# Patient Record
Sex: FEMALE | Race: WHITE
[De-identification: names, ages, dates, MRNs, and addresses within clinical notes are randomized per-mention and may not be internally consistent; named-entity substitution may affect disease eponyms.]

---

## 2017-10-11 ENCOUNTER — APPOINTMENT (OUTPATIENT)
Dept: FAMILY MEDICINE | Facility: CLINIC | Age: 52
End: 2017-10-11
Payer: COMMERCIAL

## 2017-10-11 VITALS
HEART RATE: 90 BPM | DIASTOLIC BLOOD PRESSURE: 72 MMHG | BODY MASS INDEX: 26.63 KG/M2 | SYSTOLIC BLOOD PRESSURE: 126 MMHG | OXYGEN SATURATION: 98 % | TEMPERATURE: 98.4 F | RESPIRATION RATE: 16 BRPM | WEIGHT: 186 LBS | HEIGHT: 70 IN

## 2017-10-11 DIAGNOSIS — Q07.00 ARNOLD-CHIARI SYNDROME W/OUT SPINA BIFIDA OR HYDROCEPHALUS: ICD-10-CM

## 2017-10-11 DIAGNOSIS — C51.9 MALIGNANT NEOPLASM OF VULVA, UNSPECIFIED: ICD-10-CM

## 2017-10-11 DIAGNOSIS — Z82.0 FAMILY HISTORY OF EPILEPSY AND OTHER DISEASES OF THE NERVOUS SYSTEM: ICD-10-CM

## 2017-10-11 DIAGNOSIS — I47.1 SUPRAVENTRICULAR TACHYCARDIA: ICD-10-CM

## 2017-10-11 DIAGNOSIS — F17.200 NICOTINE DEPENDENCE, UNSPECIFIED, UNCOMPLICATED: ICD-10-CM

## 2017-10-11 DIAGNOSIS — Z87.19 PERSONAL HISTORY OF OTHER DISEASES OF THE DIGESTIVE SYSTEM: ICD-10-CM

## 2017-10-11 DIAGNOSIS — Z83.79 FAMILY HISTORY OF OTHER DISEASES OF THE DIGESTIVE SYSTEM: ICD-10-CM

## 2017-10-11 PROBLEM — Z00.00 ENCOUNTER FOR PREVENTIVE HEALTH EXAMINATION: Status: ACTIVE | Noted: 2017-10-11

## 2017-10-11 PROCEDURE — G0008: CPT

## 2017-10-11 PROCEDURE — 90686 IIV4 VACC NO PRSV 0.5 ML IM: CPT

## 2017-10-11 PROCEDURE — 99214 OFFICE O/P EST MOD 30 MIN: CPT | Mod: 25

## 2017-11-06 ENCOUNTER — APPOINTMENT (OUTPATIENT)
Dept: FAMILY MEDICINE | Facility: CLINIC | Age: 52
End: 2017-11-06
Payer: COMMERCIAL

## 2017-11-06 VITALS
WEIGHT: 186 LBS | HEIGHT: 70 IN | HEART RATE: 108 BPM | TEMPERATURE: 98.1 F | OXYGEN SATURATION: 98 % | DIASTOLIC BLOOD PRESSURE: 72 MMHG | RESPIRATION RATE: 16 BRPM | SYSTOLIC BLOOD PRESSURE: 122 MMHG | BODY MASS INDEX: 26.63 KG/M2

## 2017-11-06 DIAGNOSIS — E55.9 VITAMIN D DEFICIENCY, UNSPECIFIED: ICD-10-CM

## 2017-11-06 PROCEDURE — 99214 OFFICE O/P EST MOD 30 MIN: CPT

## 2017-11-06 RX ORDER — LITHIUM CARBONATE 150 MG/1
150 CAPSULE ORAL
Qty: 90 | Refills: 0 | Status: DISCONTINUED | COMMUNITY
Start: 2017-08-01 | End: 2017-11-06

## 2017-11-06 RX ORDER — LEVOFLOXACIN 500 MG/1
500 TABLET, FILM COATED ORAL
Qty: 10 | Refills: 0 | Status: DISCONTINUED | COMMUNITY
Start: 2017-04-18 | End: 2017-11-06

## 2017-11-07 RX ORDER — ALPRAZOLAM 2 MG/1
2 TABLET ORAL
Qty: 120 | Refills: 0 | Status: DISCONTINUED | COMMUNITY
Start: 2017-04-18 | End: 2017-11-07

## 2017-11-07 RX ORDER — HYDROCODONE BITARTRATE AND HOMATROPINE METHYLBROMIDE 5; 1.5 MG/5ML; MG/5ML
5-1.5 SYRUP ORAL
Qty: 240 | Refills: 0 | Status: DISCONTINUED | COMMUNITY
Start: 2017-10-11 | End: 2017-11-07

## 2017-11-07 RX ORDER — ALPRAZOLAM 2 MG/1
2 TABLET ORAL 4 TIMES DAILY
Qty: 120 | Refills: 0 | Status: DISCONTINUED | COMMUNITY
Start: 2017-10-11 | End: 2017-11-07

## 2017-11-07 RX ORDER — HYDROCODONE BITARTRATE AND HOMATROPINE METHYLBROMIDE 5; 1.5 MG/5ML; MG/5ML
5-1.5 SYRUP ORAL
Qty: 473 | Refills: 0 | Status: DISCONTINUED | COMMUNITY
Start: 2017-04-18 | End: 2017-11-07

## 2017-11-07 RX ORDER — OMEPRAZOLE 20 MG/1
20 CAPSULE, DELAYED RELEASE ORAL
Qty: 30 | Refills: 0 | Status: DISCONTINUED | COMMUNITY
Start: 2017-03-15 | End: 2017-11-07

## 2017-12-01 ENCOUNTER — APPOINTMENT (OUTPATIENT)
Dept: FAMILY MEDICINE | Facility: CLINIC | Age: 52
End: 2017-12-01
Payer: SELF-PAY

## 2017-12-01 ENCOUNTER — RX RENEWAL (OUTPATIENT)
Age: 52
End: 2017-12-01

## 2017-12-01 VITALS
TEMPERATURE: 98.7 F | DIASTOLIC BLOOD PRESSURE: 74 MMHG | WEIGHT: 185 LBS | SYSTOLIC BLOOD PRESSURE: 132 MMHG | HEIGHT: 70 IN | HEART RATE: 88 BPM | OXYGEN SATURATION: 97 % | RESPIRATION RATE: 16 BRPM | BODY MASS INDEX: 26.48 KG/M2

## 2017-12-01 DIAGNOSIS — E53.8 DEFICIENCY OF OTHER SPECIFIED B GROUP VITAMINS: ICD-10-CM

## 2017-12-01 DIAGNOSIS — E87.6 HYPOKALEMIA: ICD-10-CM

## 2017-12-01 DIAGNOSIS — K64.9 UNSPECIFIED HEMORRHOIDS: ICD-10-CM

## 2017-12-01 PROCEDURE — 99214 OFFICE O/P EST MOD 30 MIN: CPT

## 2017-12-01 RX ORDER — VENLAFAXINE HYDROCHLORIDE 75 MG/1
75 CAPSULE, EXTENDED RELEASE ORAL
Qty: 90 | Refills: 0 | Status: DISCONTINUED | COMMUNITY
Start: 2017-11-06 | End: 2017-12-01

## 2017-12-01 RX ORDER — VENLAFAXINE HYDROCHLORIDE 150 MG/1
150 TABLET, EXTENDED RELEASE ORAL DAILY
Qty: 90 | Refills: 0 | Status: DISCONTINUED | COMMUNITY
Start: 2017-12-01 | End: 2017-12-01

## 2018-01-30 ENCOUNTER — APPOINTMENT (OUTPATIENT)
Dept: FAMILY MEDICINE | Facility: CLINIC | Age: 53
End: 2018-01-30
Payer: MEDICAID

## 2018-01-30 VITALS
HEART RATE: 116 BPM | WEIGHT: 185 LBS | HEIGHT: 70 IN | SYSTOLIC BLOOD PRESSURE: 120 MMHG | OXYGEN SATURATION: 98 % | RESPIRATION RATE: 16 BRPM | DIASTOLIC BLOOD PRESSURE: 84 MMHG | BODY MASS INDEX: 26.48 KG/M2

## 2018-01-30 DIAGNOSIS — R61 GENERALIZED HYPERHIDROSIS: ICD-10-CM

## 2018-01-30 PROCEDURE — 99213 OFFICE O/P EST LOW 20 MIN: CPT

## 2018-01-30 RX ORDER — VENLAFAXINE HYDROCHLORIDE 150 MG/1
150 CAPSULE, EXTENDED RELEASE ORAL DAILY
Qty: 90 | Refills: 3 | Status: DISCONTINUED | COMMUNITY
Start: 2017-12-01 | End: 2018-01-30

## 2018-01-30 RX ORDER — POTASSIUM CHLORIDE 750 MG/1
10 TABLET, EXTENDED RELEASE ORAL
Qty: 30 | Refills: 0 | Status: DISCONTINUED | COMMUNITY
Start: 2017-10-11 | End: 2018-01-30

## 2018-03-01 ENCOUNTER — MEDICATION RENEWAL (OUTPATIENT)
Age: 53
End: 2018-03-01

## 2018-03-29 ENCOUNTER — APPOINTMENT (OUTPATIENT)
Dept: FAMILY MEDICINE | Facility: CLINIC | Age: 53
End: 2018-03-29
Payer: SELF-PAY

## 2018-03-29 VITALS
WEIGHT: 185 LBS | SYSTOLIC BLOOD PRESSURE: 108 MMHG | RESPIRATION RATE: 16 BRPM | BODY MASS INDEX: 26.48 KG/M2 | DIASTOLIC BLOOD PRESSURE: 70 MMHG | HEIGHT: 70 IN

## 2018-03-29 DIAGNOSIS — Z12.31 ENCOUNTER FOR SCREENING MAMMOGRAM FOR MALIGNANT NEOPLASM OF BREAST: ICD-10-CM

## 2018-03-29 PROCEDURE — 99213 OFFICE O/P EST LOW 20 MIN: CPT

## 2018-03-29 RX ORDER — VENLAFAXINE 37.5 MG/1
37.5 TABLET, EXTENDED RELEASE ORAL DAILY
Qty: 30 | Refills: 0 | Status: DISCONTINUED | COMMUNITY
Start: 2018-01-30 | End: 2018-03-29

## 2018-03-29 RX ORDER — VARENICLINE TARTRATE 1 MG/1
1 TABLET, FILM COATED ORAL
Qty: 56 | Refills: 0 | Status: DISCONTINUED | COMMUNITY
Start: 2017-05-16 | End: 2018-03-29

## 2018-04-26 ENCOUNTER — APPOINTMENT (OUTPATIENT)
Dept: FAMILY MEDICINE | Facility: CLINIC | Age: 53
End: 2018-04-26
Payer: COMMERCIAL

## 2018-04-26 VITALS
OXYGEN SATURATION: 97 % | DIASTOLIC BLOOD PRESSURE: 70 MMHG | SYSTOLIC BLOOD PRESSURE: 110 MMHG | HEIGHT: 70 IN | WEIGHT: 185 LBS | BODY MASS INDEX: 26.48 KG/M2 | HEART RATE: 94 BPM

## 2018-04-26 DIAGNOSIS — R20.0 ANESTHESIA OF SKIN: ICD-10-CM

## 2018-04-26 PROCEDURE — 99214 OFFICE O/P EST MOD 30 MIN: CPT

## 2018-05-24 ENCOUNTER — RX RENEWAL (OUTPATIENT)
Age: 53
End: 2018-05-24

## 2018-05-24 ENCOUNTER — APPOINTMENT (OUTPATIENT)
Dept: FAMILY MEDICINE | Facility: CLINIC | Age: 53
End: 2018-05-24
Payer: MEDICAID

## 2018-05-24 VITALS
HEART RATE: 105 BPM | HEIGHT: 70 IN | SYSTOLIC BLOOD PRESSURE: 114 MMHG | OXYGEN SATURATION: 96 % | WEIGHT: 185 LBS | BODY MASS INDEX: 26.48 KG/M2 | DIASTOLIC BLOOD PRESSURE: 74 MMHG

## 2018-05-24 LAB — CYTOLOGY CVX/VAG DOC THIN PREP: NORMAL

## 2018-05-24 PROCEDURE — 99213 OFFICE O/P EST LOW 20 MIN: CPT

## 2018-05-24 RX ORDER — NITROFURANTOIN (MONOHYDRATE/MACROCRYSTALS) 25; 75 MG/1; MG/1
100 CAPSULE ORAL DAILY
Qty: 20 | Refills: 0 | Status: DISCONTINUED | COMMUNITY
Start: 2018-03-29 | End: 2018-05-24

## 2018-05-24 NOTE — REVIEW OF SYSTEMS
[Fatigue] : fatigue [Cough] : cough [Anxiety] : anxiety [Depression] : depression [Negative] : Cardiovascular
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
## 2018-05-24 NOTE — ASSESSMENT
[FreeTextEntry1] :  As per usual protocol the patient was advised in regards to the risks of driving when on medications with side effects of dizziness or drowsiness. Patient has been assessed  for increase risk for respiratory depression. Istop checked.\par Emotional support given\par obtain labs from Rheumatology

## 2018-05-24 NOTE — PHYSICAL EXAM
[Well Developed] : well developed [Well Nourished] : well nourished [Pressed] : was pressed [Normal Rhythm/Effort] : normal respiratory rhythm and effort [Clear Bilaterally] : the lungs were clear to auscultation bilaterally [Normal to Percussion] : the lungs were normal to percussion [Normal] : palpation of the chest was normal [Normal Rate] : normal [Normal S1] : normal S1 [Normal S2] : normal S2 [No Murmur] : no murmurs heard [No Pitting Edema] : no pitting edema present [2+] : left 2+ [No Abnormalities] : the abdominal aorta was not enlarged and no bruit was heard [S3] : no S3 [S4] : no S4 [Right Carotid Bruit] : no bruit heard over the right carotid [Left Carotid Bruit] : no bruit heard over the left carotid [Right Femoral Bruit] : no bruit heard over the right femoral artery [Left Femoral Bruit] : no bruit heard over the left femoral artery [de-identified] : Right ankle brace

## 2018-06-20 ENCOUNTER — MEDICATION RENEWAL (OUTPATIENT)
Age: 53
End: 2018-06-20

## 2018-06-21 ENCOUNTER — APPOINTMENT (OUTPATIENT)
Dept: FAMILY MEDICINE | Facility: CLINIC | Age: 53
End: 2018-06-21

## 2018-07-18 ENCOUNTER — APPOINTMENT (OUTPATIENT)
Dept: FAMILY MEDICINE | Facility: CLINIC | Age: 53
End: 2018-07-18
Payer: MEDICAID

## 2018-07-18 VITALS
HEART RATE: 100 BPM | DIASTOLIC BLOOD PRESSURE: 82 MMHG | OXYGEN SATURATION: 97 % | BODY MASS INDEX: 27.84 KG/M2 | SYSTOLIC BLOOD PRESSURE: 116 MMHG | WEIGHT: 194 LBS

## 2018-07-18 PROCEDURE — 36415 COLL VENOUS BLD VENIPUNCTURE: CPT

## 2018-07-18 PROCEDURE — 99214 OFFICE O/P EST MOD 30 MIN: CPT | Mod: 25

## 2018-07-18 RX ORDER — QUETIAPINE FUMARATE 50 MG/1
50 TABLET ORAL
Qty: 30 | Refills: 0 | Status: DISCONTINUED | COMMUNITY
Start: 2018-05-24 | End: 2018-07-18

## 2018-07-18 NOTE — ASSESSMENT
[FreeTextEntry1] : restart omeprazole she has it at home she may increase to bid for two weeks if no relief go to Gastroenterology. Obtain liver function test to follow up alk phos alt.\par renew meds.  As per usual protocol the patient was advised in regards to the risks of driving when on medications with side effects of dizziness or drowsiness. Patient has been assessed  for increase risk for respiratory depression. Istop checked.\par wean cough medicine\par \par

## 2018-07-18 NOTE — HISTORY OF PRESENT ILLNESS
[FreeTextEntry8] : Pt is here for digestive issue. Pt c/o stomach cramping and diarrhea for about 1 day.  I don’t know if it is something I ate or stress or a virus. I ate cinnamon buns rice and vegetables carrots peas and mushrooms. I had diarrhea all day. I tried to eat chicken soup and bread.  No blood in the stool . No fever no chills.  Last month I had flu like symptoms with out a fever last month . I had migraines I had a runny nose coughing and it lasted 3 weeks. I was weak. I was able to get medicaid. I had to pay for meds  for cough because medicaid doesn’tt cover it . I cannot take cherry nyquil.  The cough medicine helps me.  Pt would also like refill on medication. \par I have a history of duodenal ulcer and twisted intestine. I had colonoscopy and endoscopy The rheum said my lft is high. ( see results).\par \par \par my son nathalie isnt living with me anymore. He is living with his dad. He is jaundice. He is supposed to go to have genetic blood disorders. I am angry and frustrated .

## 2018-07-18 NOTE — REVIEW OF SYSTEMS
[Fatigue] : fatigue [Abdominal Pain] : abdominal pain [Nausea] : nausea [Diarrhea] : diarrhea [Anxiety] : anxiety [Depression] : depression [Negative] : Respiratory

## 2018-07-18 NOTE — PHYSICAL EXAM
[Normal Rhythm/Effort] : normal respiratory rhythm and effort [Clear Bilaterally] : the lungs were clear to auscultation bilaterally [Normal to Percussion] : the lungs were normal to percussion [Normal] : palpation of the chest was normal [Normal Rate] : normal [Normal S1] : normal S1 [Normal S2] : normal S2 [No Murmur] : no murmurs heard [No Pitting Edema] : no pitting edema present [2+] : left 2+ [No Abnormalities] : the abdominal aorta was not enlarged and no bruit was heard [Epigastric] : in the epigastric area [LUQ] : in the left upper quadrant [No HSM] : no hepatosplenomegaly noted [S3] : no S3 [S4] : no S4 [Right Carotid Bruit] : no bruit heard over the right carotid [Left Carotid Bruit] : no bruit heard over the left carotid [Right Femoral Bruit] : no bruit heard over the right femoral artery [Left Femoral Bruit] : no bruit heard over the left femoral artery [Periumbilical] : not periumbilical [Suprapubic] : not in the suprapubic area [RUQ] : not in the RUQ [RLQ] : not in the RLQ [Liver Tender To Palpation] : not tender [de-identified] : right ankle in brace

## 2018-07-23 LAB
ALBUMIN SERPL ELPH-MCNC: 4.4 G/DL
ALKPISO INTERP: NORMAL
ALP BLD-CCNC: 127 U/L
ALP BLD-CCNC: 129 U/L
ALP BONE CFR SERPL: 35 %
ALP INTEST CFR SERPL: 0 %
ALP LIVER CFR SERPL: 65 %
ALP MACRO CFR SERPL: 0 %
ALP PLAC CFR SERPL: 0 %
ALT SERPL-CCNC: 14 U/L
ANION GAP SERPL CALC-SCNC: 16 MMOL/L
AST SERPL-CCNC: 19 U/L
BASOPHILS # BLD AUTO: 0.06 K/UL
BASOPHILS NFR BLD AUTO: 0.6 %
BILIRUB SERPL-MCNC: 0.5 MG/DL
BUN SERPL-MCNC: 4 MG/DL
CALCIUM SERPL-MCNC: 9.5 MG/DL
CHLORIDE SERPL-SCNC: 107 MMOL/L
CO2 SERPL-SCNC: 24 MMOL/L
CREAT SERPL-MCNC: 0.77 MG/DL
EOSINOPHIL # BLD AUTO: 0.24 K/UL
EOSINOPHIL NFR BLD AUTO: 2.5 %
GLUCOSE SERPL-MCNC: 95 MG/DL
HCT VFR BLD CALC: 44.5 %
HGB BLD-MCNC: 14.6 G/DL
IMM GRANULOCYTES NFR BLD AUTO: 0.3 %
LYMPHOCYTES # BLD AUTO: 4.37 K/UL
LYMPHOCYTES NFR BLD AUTO: 45.6 %
MAN DIFF?: NORMAL
MCHC RBC-ENTMCNC: 32.5 PG
MCHC RBC-ENTMCNC: 32.8 GM/DL
MCV RBC AUTO: 99.1 FL
MONOCYTES # BLD AUTO: 0.48 K/UL
MONOCYTES NFR BLD AUTO: 5 %
NEUTROPHILS # BLD AUTO: 4.4 K/UL
NEUTROPHILS NFR BLD AUTO: 46 %
PLATELET # BLD AUTO: 386 K/UL
POTASSIUM SERPL-SCNC: 3.9 MMOL/L
PROT SERPL-MCNC: 7.5 G/DL
RBC # BLD: 4.49 M/UL
RBC # FLD: 13.9 %
SODIUM SERPL-SCNC: 147 MMOL/L
WBC # FLD AUTO: 9.58 K/UL

## 2018-08-20 ENCOUNTER — RX RENEWAL (OUTPATIENT)
Age: 53
End: 2018-08-20

## 2018-08-31 ENCOUNTER — APPOINTMENT (OUTPATIENT)
Dept: FAMILY MEDICINE | Facility: CLINIC | Age: 53
End: 2018-08-31
Payer: MEDICAID

## 2018-08-31 VITALS
BODY MASS INDEX: 25.77 KG/M2 | DIASTOLIC BLOOD PRESSURE: 78 MMHG | SYSTOLIC BLOOD PRESSURE: 124 MMHG | HEART RATE: 78 BPM | WEIGHT: 180 LBS | OXYGEN SATURATION: 98 % | RESPIRATION RATE: 16 BRPM | HEIGHT: 70 IN

## 2018-08-31 PROCEDURE — 36415 COLL VENOUS BLD VENIPUNCTURE: CPT

## 2018-08-31 PROCEDURE — 99213 OFFICE O/P EST LOW 20 MIN: CPT | Mod: 25

## 2018-08-31 NOTE — PHYSICAL EXAM
[Ill-Appearing] : ill-appearing [Conjunctiva] : the conjunctiva were normal in both eyes [PERRL] : pupils were equal in size, round, and reactive to light [EOM Intact] : extraocular movements were intact [Normal Rhythm/Effort] : normal respiratory rhythm and effort [Clear Bilaterally] : the lungs were clear to auscultation bilaterally [Normal to Percussion] : the lungs were normal to percussion [Normal] : palpation of the chest was normal [Normal Rate] : normal [Normal S1] : normal S1 [Normal S2] : normal S2 [No Murmur] : no murmurs heard [No Pitting Edema] : no pitting edema present [2+] : left 2+ [No Abnormalities] : the abdominal aorta was not enlarged and no bruit was heard [Anxious] : anxious [S3] : no S3 [S4] : no S4 [Right Carotid Bruit] : no bruit heard over the right carotid [Left Carotid Bruit] : no bruit heard over the left carotid [Right Femoral Bruit] : no bruit heard over the right femoral artery [Left Femoral Bruit] : no bruit heard over the left femoral artery

## 2018-08-31 NOTE — REVIEW OF SYSTEMS
[Fatigue] : fatigue [Recent Change In Weight] : ~T recent weight change [Cough] : cough [Negative] : Cardiovascular

## 2018-08-31 NOTE — ASSESSMENT
[FreeTextEntry1] : patient has xanax. She will trintellix.  As per usual protocol the patient was advised in regards to the risks of driving when on medications with side effects of dizziness or drowsiness. Patient has been assessed  for increase risk for respiratory depression. Istop checked.\par Labs drawn/ specimens obtained  in office on this date of service  for evaluation of   assessed conditions -hyperlipidemia, high sodium      to be run at [facility].\par

## 2018-08-31 NOTE — HISTORY OF PRESENT ILLNESS
[FreeTextEntry1] : Pt presents for med check [de-identified] : 51 yo wf here for follow up medications for anxiety  I am not eating I am still tired. i sleep about 6 hrs and I am up. My anxiety is throught the roof. My son is living with my . His health is not good. My other son is not working. He needs to see a psychiatrist.  My other son who graduated is looking for a job.  [employer]. \par

## 2018-09-04 LAB
ALBUMIN SERPL ELPH-MCNC: 4.1 G/DL
ALP BLD-CCNC: 122 U/L
ALT SERPL-CCNC: 19 U/L
ANION GAP SERPL CALC-SCNC: 15 MMOL/L
AST SERPL-CCNC: 23 U/L
BILIRUB SERPL-MCNC: 0.2 MG/DL
BUN SERPL-MCNC: 5 MG/DL
CALCIUM SERPL-MCNC: 9.2 MG/DL
CHLORIDE SERPL-SCNC: 107 MMOL/L
CHOLEST SERPL-MCNC: 271 MG/DL
CHOLEST/HDLC SERPL: 7.3 RATIO
CO2 SERPL-SCNC: 24 MMOL/L
CREAT SERPL-MCNC: 0.84 MG/DL
GLUCOSE SERPL-MCNC: 80 MG/DL
HDLC SERPL-MCNC: 37 MG/DL
LDLC SERPL CALC-MCNC: 192 MG/DL
POTASSIUM SERPL-SCNC: 3.6 MMOL/L
PROT SERPL-MCNC: 7.3 G/DL
SODIUM SERPL-SCNC: 146 MMOL/L
TRIGL SERPL-MCNC: 212 MG/DL

## 2018-09-11 LAB — COMPREHENSIVE SCREEN URINE: NORMAL

## 2018-09-11 RX ORDER — ROSUVASTATIN CALCIUM 20 MG/1
20 TABLET, FILM COATED ORAL DAILY
Qty: 90 | Refills: 3 | Status: DISCONTINUED | COMMUNITY
Start: 2017-03-20 | End: 2018-09-11

## 2018-09-24 ENCOUNTER — APPOINTMENT (OUTPATIENT)
Dept: FAMILY MEDICINE | Facility: CLINIC | Age: 53
End: 2018-09-24
Payer: MEDICAID

## 2018-09-24 VITALS
WEIGHT: 180 LBS | OXYGEN SATURATION: 98 % | SYSTOLIC BLOOD PRESSURE: 132 MMHG | RESPIRATION RATE: 16 BRPM | HEIGHT: 70 IN | HEART RATE: 66 BPM | BODY MASS INDEX: 25.77 KG/M2 | DIASTOLIC BLOOD PRESSURE: 84 MMHG

## 2018-09-24 PROCEDURE — 99213 OFFICE O/P EST LOW 20 MIN: CPT

## 2018-09-24 NOTE — PHYSICAL EXAM
[Tremulous] : was tremulous [Normal Rhythm/Effort] : normal respiratory rhythm and effort [Clear Bilaterally] : the lungs were clear to auscultation bilaterally [Normal to Percussion] : the lungs were normal to percussion [Normal] : palpation of the chest was normal [Normal Rate] : normal [Normal S1] : normal S1 [Normal S2] : normal S2 [No Murmur] : no murmurs heard [No Pitting Edema] : no pitting edema present [2+] : left 2+ [No Abnormalities] : the abdominal aorta was not enlarged and no bruit was heard [S3] : no S3 [S4] : no S4 [Right Carotid Bruit] : no bruit heard over the right carotid [Left Carotid Bruit] : no bruit heard over the left carotid [Right Femoral Bruit] : no bruit heard over the right femoral artery [Left Femoral Bruit] : no bruit heard over the left femoral artery

## 2018-09-24 NOTE — REVIEW OF SYSTEMS
[Fatigue] : fatigue [Recent Change In Weight] : ~T recent weight change [Palpitations] : palpitations [Cough] : cough [Anxiety] : anxiety [Depression] : depression [Negative] : ENT

## 2018-09-24 NOTE — ASSESSMENT
[FreeTextEntry1] : renew xanax\par  As per usual protocol the patient was advised in regards to the risks of driving when on medications with side effects of dizziness or drowsiness. Patient has been assessed  for increase risk for respiratory depression. Istop checked.\par

## 2018-09-24 NOTE — HISTORY OF PRESENT ILLNESS
[FreeTextEntry1] : Patients presents for medication refill [de-identified] : 51 yo wf here for follow up on anxiety and depression\par Things are insane. 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 [employer] says its not their fault and it is the 's fault. It is thursday.\par I have a half of a xanax left

## 2018-10-23 ENCOUNTER — MEDICATION RENEWAL (OUTPATIENT)
Age: 53
End: 2018-10-23

## 2018-10-24 ENCOUNTER — APPOINTMENT (OUTPATIENT)
Dept: FAMILY MEDICINE | Facility: CLINIC | Age: 53
End: 2018-10-24

## 2018-11-21 ENCOUNTER — APPOINTMENT (OUTPATIENT)
Dept: FAMILY MEDICINE | Facility: CLINIC | Age: 53
End: 2018-11-21
Payer: MEDICAID

## 2018-11-21 VITALS
SYSTOLIC BLOOD PRESSURE: 124 MMHG | HEIGHT: 70 IN | RESPIRATION RATE: 16 BRPM | BODY MASS INDEX: 25.77 KG/M2 | WEIGHT: 180 LBS | HEART RATE: 76 BPM | DIASTOLIC BLOOD PRESSURE: 82 MMHG | OXYGEN SATURATION: 97 %

## 2018-11-21 PROCEDURE — 99213 OFFICE O/P EST LOW 20 MIN: CPT

## 2018-11-21 NOTE — HISTORY OF PRESENT ILLNESS
[FreeTextEntry1] : Patient presents for med check\par  [de-identified] : 54 yo wf here for follow up on anxiety. This is a hard time of year.  I am not compliant with my cholesterol meds. I forget. My ankle is still sore abraham with the cold. I am still smoking.  I had run out of my meds and I was going through withdrawals.

## 2018-11-21 NOTE — ASSESSMENT
[FreeTextEntry1] : renew xanax.  As per usual protocol the patient was advised in regards to the risks of driving when on medications with side effects of dizziness or drowsiness. Patient has been assessed  for increase risk for respiratory depression. Istop checked.\par patient doesn’t tolerate any other cholesterol meds other than rosuvastatin and it not covered. Basic cardiovascular prevention measures are advised including regular exercise, surveillance medical examination, and prudent portion-controlled low fat diet, rich in a variety of vegetables with minimal added sugars, refined starches, and no artificially hydrogenated oils.\par

## 2018-11-21 NOTE — REVIEW OF SYSTEMS
[Fatigue] : fatigue [Recent Change In Weight] : ~T recent weight change [Cough] : cough [Anxiety] : anxiety [Negative] : Cardiovascular

## 2018-11-21 NOTE — PHYSICAL EXAM
[No Acute Distress] : no acute distress [Well Nourished] : well nourished [Well Developed] : well developed [Normal Sclera/Conjunctiva] : normal sclera/conjunctiva [PERRL] : pupils equal round and reactive to light [Normal Outer Ear/Nose] : the outer ears and nose were normal in appearance [Normal Oropharynx] : the oropharynx was normal [No JVD] : no jugular venous distention [No Respiratory Distress] : no respiratory distress  [Clear to Auscultation] : lungs were clear to auscultation bilaterally [Normal Rate] : normal rate  [Regular Rhythm] : with a regular rhythm

## 2018-11-21 NOTE — HEALTH RISK ASSESSMENT
[No falls in past year] : Patient reported no falls in the past year [] : No [de-identified] : rheumatology

## 2018-12-07 ENCOUNTER — MEDICATION RENEWAL (OUTPATIENT)
Age: 53
End: 2018-12-07

## 2018-12-13 RX ORDER — VORTIOXETINE 20 MG/1
20 TABLET, FILM COATED ORAL
Qty: 30 | Refills: 3 | Status: DISCONTINUED | COMMUNITY
Start: 2018-05-24 | End: 2018-12-13

## 2018-12-21 ENCOUNTER — APPOINTMENT (OUTPATIENT)
Dept: FAMILY MEDICINE | Facility: CLINIC | Age: 53
End: 2018-12-21
Payer: MEDICAID

## 2018-12-21 VITALS
HEART RATE: 97 BPM | BODY MASS INDEX: 25.48 KG/M2 | HEIGHT: 70 IN | OXYGEN SATURATION: 97 % | DIASTOLIC BLOOD PRESSURE: 90 MMHG | RESPIRATION RATE: 16 BRPM | SYSTOLIC BLOOD PRESSURE: 126 MMHG | TEMPERATURE: 98.7 F | WEIGHT: 178 LBS

## 2018-12-21 PROCEDURE — 99213 OFFICE O/P EST LOW 20 MIN: CPT

## 2018-12-21 NOTE — PHYSICAL EXAM
[Ill-Appearing] : ill-appearing [Scattered Wheezes] : scattered wheezing was heard [Rhonchi Bilateral] : rhonchi were heard diffusely over both lungs [Normal Rate] : normal [Normal S1] : normal S1 [Normal S2] : normal S2 [No Murmur] : no murmurs heard [No Pitting Edema] : no pitting edema present [2+] : left 2+ [No Abnormalities] : the abdominal aorta was not enlarged and no bruit was heard [S3] : no S3 [S4] : no S4 [Right Carotid Bruit] : no bruit heard over the right carotid [Left Carotid Bruit] : no bruit heard over the left carotid [Right Femoral Bruit] : no bruit heard over the right femoral artery [Left Femoral Bruit] : no bruit heard over the left femoral artery

## 2018-12-21 NOTE — ASSESSMENT
[FreeTextEntry1] : Take antibiotics,  rest,  increase fluids, wash hands to prevent the spread of  infection . Take mucinex dm over the counter. Take tylenol or advil for fever or body aches. Follow up if no better or worse respiratory symptoms.\par renew xanax  As per usual protocol the patient was advised in regards to the risks of driving when on medications with side effects of dizziness or drowsiness. Patient has been assessed  for increase risk for respiratory depression. Istop checked.\par

## 2018-12-21 NOTE — HISTORY OF PRESENT ILLNESS
[FreeTextEntry8] : Patient C/O \par  +ST, +cough with phlegm, +HA, + ear pain(LEFT), -sinus pressure, +congestion, -temp, +SOB   About three days ago\par I have been exposed to pneumonia\par

## 2018-12-21 NOTE — REVIEW OF SYSTEMS
[Chills] : chills [Fatigue] : fatigue [Recent Change In Weight] : ~T recent weight change [Shortness Of Breath] : shortness of breath [Wheezing] : wheezing [Cough] : cough

## 2019-01-18 ENCOUNTER — APPOINTMENT (OUTPATIENT)
Dept: FAMILY MEDICINE | Facility: CLINIC | Age: 54
End: 2019-01-18
Payer: MEDICAID

## 2019-01-18 VITALS
HEART RATE: 96 BPM | SYSTOLIC BLOOD PRESSURE: 130 MMHG | OXYGEN SATURATION: 95 % | BODY MASS INDEX: 25.48 KG/M2 | HEIGHT: 70 IN | DIASTOLIC BLOOD PRESSURE: 80 MMHG | RESPIRATION RATE: 16 BRPM | WEIGHT: 178 LBS

## 2019-01-18 DIAGNOSIS — R53.1 WEAKNESS: ICD-10-CM

## 2019-01-18 PROCEDURE — 99213 OFFICE O/P EST LOW 20 MIN: CPT | Mod: 25

## 2019-01-18 PROCEDURE — 36415 COLL VENOUS BLD VENIPUNCTURE: CPT

## 2019-01-18 RX ORDER — LEVOFLOXACIN 500 MG/1
500 TABLET, FILM COATED ORAL DAILY
Qty: 10 | Refills: 0 | Status: DISCONTINUED | COMMUNITY
Start: 2018-12-21 | End: 2019-01-18

## 2019-01-18 NOTE — PHYSICAL EXAM
[Ill-Appearing] : ill-appearing [Normal Sclera/Conjunctiva] : normal sclera/conjunctiva [PERRL] : pupils equal round and reactive to light [No Lymphadenopathy] : no lymphadenopathy [Wet Cough] : a wet cough was heard [Scattered Wheezes] : scattered wheezing was heard [Rales / Crackles Right] : crackles were heard diffusely over the right  lung [Rales / Crackles Right [place]] : crackles were heard over the right apex [Rales / Crackles Right Midlung Field] : crackles were heard over the right midlung field [Rales / Crackles Right Base] : crackles were heard over the right base [Wheezing Bilaterally] : wheezing was heard diffusely over both lungs [Rhonchi Bilateral] : rhonchi were heard diffusely over both lungs

## 2019-01-18 NOTE — HEALTH RISK ASSESSMENT
[No falls in past year] : Patient reported no falls in the past year [3] : 2) Feeling down, depressed, or hopeless for nearly every day (3) [] : No [de-identified] : no [BGM2Stuas] : 6 [ULS2Foolr] : 24

## 2019-01-18 NOTE — HISTORY OF PRESENT ILLNESS
[FreeTextEntry1] : patient present for 1 month follow up [de-identified] : 54 yo wf here for follow up on CATALINA\par I have been sick for over 1 month. I take day quil I take robitussin dm.  I am coughing. I had a blown ear drum. I started throwing up too fever sweats and then got better and now I have another cold. I cant get rid of it. my son is sick too. I am so tired . I am so tired I sleep 12 hrs and I am still exhausted and weak.

## 2019-01-18 NOTE — ASSESSMENT
[FreeTextEntry1] : Take antibiotics,  rest,  increase fluids, wash hands to prevent the spread of  infection . Take mucinex dm over the counter. Take tylenol or advil for fever or body aches. Follow up if no better or worse respiratory symptoms.\par  As per usual protocol the patient was advised in regards to the risks of driving when on medications with side effects of dizziness or drowsiness. Patient has been assessed  for increase risk for respiratory depression. Istop checked.\par Labs drawn/ specimens obtained  in office on this date of service  for evaluation of   assessed conditions -  cbc  to be run at [facility].\par  \par Activity as tolerated\par Go to ER if worse chest pain or shortness of breath.\par

## 2019-01-21 LAB
ALBUMIN SERPL ELPH-MCNC: 3.8 G/DL
ALP BLD-CCNC: 171 U/L
ALT SERPL-CCNC: 12 U/L
ANION GAP SERPL CALC-SCNC: 11 MMOL/L
AST SERPL-CCNC: 21 U/L
BASOPHILS # BLD AUTO: 0.05 K/UL
BASOPHILS NFR BLD AUTO: 0.6 %
BILIRUB SERPL-MCNC: 0.2 MG/DL
BUN SERPL-MCNC: 10 MG/DL
CALCIUM SERPL-MCNC: 9.2 MG/DL
CHLORIDE SERPL-SCNC: 108 MMOL/L
CHOLEST SERPL-MCNC: 223 MG/DL
CHOLEST/HDLC SERPL: 6.4 RATIO
CO2 SERPL-SCNC: 23 MMOL/L
CREAT SERPL-MCNC: 0.92 MG/DL
EOSINOPHIL # BLD AUTO: 0.38 K/UL
EOSINOPHIL NFR BLD AUTO: 4.3 %
FOLATE SERPL-MCNC: 5 NG/ML
GLUCOSE SERPL-MCNC: 93 MG/DL
HCT VFR BLD CALC: 41.1 %
HDLC SERPL-MCNC: 35 MG/DL
HGB BLD-MCNC: 13.4 G/DL
IMM GRANULOCYTES NFR BLD AUTO: 0.2 %
LDLC SERPL CALC-MCNC: 161 MG/DL
LYMPHOCYTES # BLD AUTO: 3.13 K/UL
LYMPHOCYTES NFR BLD AUTO: 35.4 %
MAN DIFF?: NORMAL
MCHC RBC-ENTMCNC: 30.9 PG
MCHC RBC-ENTMCNC: 32.6 GM/DL
MCV RBC AUTO: 94.7 FL
MONOCYTES # BLD AUTO: 0.54 K/UL
MONOCYTES NFR BLD AUTO: 6.1 %
NEUTROPHILS # BLD AUTO: 4.73 K/UL
NEUTROPHILS NFR BLD AUTO: 53.4 %
PLATELET # BLD AUTO: 359 K/UL
POTASSIUM SERPL-SCNC: 4.1 MMOL/L
PROT SERPL-MCNC: 6.8 G/DL
RBC # BLD: 4.34 M/UL
RBC # FLD: 14.2 %
SODIUM SERPL-SCNC: 142 MMOL/L
TRIGL SERPL-MCNC: 134 MG/DL
TSH SERPL-ACNC: 2.33 UIU/ML
VIT B12 SERPL-MCNC: 860 PG/ML
WBC # FLD AUTO: 8.85 K/UL

## 2019-02-13 ENCOUNTER — LABORATORY RESULT (OUTPATIENT)
Age: 54
End: 2019-02-13

## 2019-02-14 ENCOUNTER — APPOINTMENT (OUTPATIENT)
Dept: FAMILY MEDICINE | Facility: CLINIC | Age: 54
End: 2019-02-14
Payer: MEDICAID

## 2019-02-14 VITALS
RESPIRATION RATE: 16 BRPM | OXYGEN SATURATION: 97 % | BODY MASS INDEX: 25.48 KG/M2 | SYSTOLIC BLOOD PRESSURE: 120 MMHG | HEIGHT: 70 IN | WEIGHT: 178 LBS | HEART RATE: 102 BPM | DIASTOLIC BLOOD PRESSURE: 80 MMHG

## 2019-02-14 PROCEDURE — 99213 OFFICE O/P EST LOW 20 MIN: CPT

## 2019-02-14 RX ORDER — AMOXICILLIN AND CLAVULANATE POTASSIUM 875; 125 MG/1; MG/1
875-125 TABLET, COATED ORAL
Qty: 20 | Refills: 0 | Status: DISCONTINUED | COMMUNITY
Start: 2019-01-18 | End: 2019-02-14

## 2019-02-14 RX ORDER — PREDNISONE 20 MG/1
20 TABLET ORAL
Qty: 16 | Refills: 0 | Status: DISCONTINUED | COMMUNITY
Start: 2019-01-18 | End: 2019-02-14

## 2019-02-14 NOTE — ASSESSMENT
[FreeTextEntry1] : renew xanax and cough syrup. trial lithium for anxiety take mirtazepine earlier in the evening for reduction of fatigue. \par try daliresp for copd. mucolytic\par culture of sputum\par  As per usual protocol the patient was advised in regards to the risks of driving when on medications with side effects of dizziness or drowsiness. Patient has been assessed  for increase risk for respiratory depression. Patient denies suicidal ideations or plan.  Istop checked.\par We spent sufficient time to discuss aspects of care; questions were answered  to patient's satisfaction.The diagnosis and care plan were discussed with patient in detail.  Patient test results were  reviewed and explained in full. All questions were answered.\par mammo\par

## 2019-02-14 NOTE — PHYSICAL EXAM
[Ill-Appearing] : ill-appearing [Normal Sclera/Conjunctiva] : normal sclera/conjunctiva [Normal Outer Ear/Nose] : the outer ears and nose were normal in appearance [Normal Oropharynx] : the oropharynx was normal [Scattered Wheezes] : scattered wheezing was heard [Rhonchi Bilateral] : rhonchi were heard diffusely over both lungs [Normal Rate] : normal rate  [Regular Rhythm] : with a regular rhythm [Normal S1, S2] : normal S1 and S2 [de-identified] : left TM hole

## 2019-02-14 NOTE — HEALTH RISK ASSESSMENT
[No falls in past year] : Patient reported no falls in the past year [] : No [de-identified] : rheum

## 2019-02-14 NOTE — HISTORY OF PRESENT ILLNESS
[FreeTextEntry1] : Patient presents for 1 month follow up [de-identified] : 52 yo wf here for follow up on anxiety. \par I have been coughing stuff up. I have a sample. \par I want to ask about the remeron. I think it is working but I am eating all the time and I am starving all the time. I am not sure if it is poor impulse control. I was feeling better one night brushing my hair I looked at my bangs and cut them. It did not go well. It makes me eat. I don’t want to end up depressed from being fat. 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 \par I am tired again. My legs are heavy and when it is cold I could nto breath I need an inhaler . Oscar said he could not breath either and he doesn’t smoke. I am concerned with pneumonia. I have been on levaquin and augmentin. I feel like it is coming back. \par

## 2019-03-15 ENCOUNTER — MEDICATION RENEWAL (OUTPATIENT)
Age: 54
End: 2019-03-15

## 2019-04-15 ENCOUNTER — APPOINTMENT (OUTPATIENT)
Dept: FAMILY MEDICINE | Facility: CLINIC | Age: 54
End: 2019-04-15
Payer: MEDICAID

## 2019-04-15 VITALS
BODY MASS INDEX: 25.77 KG/M2 | HEIGHT: 70 IN | RESPIRATION RATE: 16 BRPM | HEART RATE: 108 BPM | WEIGHT: 180 LBS | SYSTOLIC BLOOD PRESSURE: 122 MMHG | DIASTOLIC BLOOD PRESSURE: 80 MMHG | OXYGEN SATURATION: 97 %

## 2019-04-15 DIAGNOSIS — J18.0 BRONCHOPNEUMONIA, UNSPECIFIED ORGANISM: ICD-10-CM

## 2019-04-15 PROCEDURE — 99213 OFFICE O/P EST LOW 20 MIN: CPT

## 2019-04-15 RX ORDER — ROSUVASTATIN CALCIUM 20 MG/1
20 TABLET, FILM COATED ORAL
Qty: 30 | Refills: 3 | Status: DISCONTINUED | COMMUNITY
Start: 2018-09-04 | End: 2019-04-15

## 2019-04-15 RX ORDER — OMEPRAZOLE 40 MG/1
40 CAPSULE, DELAYED RELEASE ORAL
Qty: 30 | Refills: 3 | Status: DISCONTINUED | COMMUNITY
Start: 2017-08-01 | End: 2019-04-15

## 2019-04-15 RX ORDER — MIRTAZAPINE 15 MG/1
15 TABLET, FILM COATED ORAL
Qty: 90 | Refills: 0 | Status: DISCONTINUED | COMMUNITY
Start: 2018-12-13 | End: 2019-04-15

## 2019-04-15 RX ORDER — ROFLUMILAST 500 UG/1
500 TABLET ORAL DAILY
Qty: 30 | Refills: 3 | Status: DISCONTINUED | COMMUNITY
Start: 2019-02-14 | End: 2019-04-15

## 2019-04-15 NOTE — HEALTH RISK ASSESSMENT
[No falls in past year] : Patient reported no falls in the past year [30 or more] : 30 or more [] : No [de-identified] : no [2] : 2) Feeling down, depressed, or hopeless for more than half of the days (2) [de-identified] : none [UCG8Velir] : 4 [de-identified] : poor [JKT4Ptzpi] : 12
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
## 2019-04-15 NOTE — ASSESSMENT
[FreeTextEntry1] :  renew xanax As per usual protocol the patient was advised in regards to the risks of driving when on medications with side effects of dizziness or drowsiness. Patient has been assessed  for increase risk for respiratory depression. Patient denies suicidal ideations or plan.  Istop checked. phq improved  her total was 13 down from 21\par renew all other meds she is low on all.\par We spent sufficient time to discuss aspects of care; questions were answered  to patient's satisfaction.The diagnosis and care plan were discussed with patient in detail.  Patient test results were  reviewed and explained in full. All questions and concerns  were answered to the best of my knowledge.\par

## 2019-04-15 NOTE — PHYSICAL EXAM
[Normal Oropharynx] : the oropharynx was normal [Supple] : supple [No Accessory Muscle Use] : no accessory muscle use [Clear to Auscultation] : lungs were clear to auscultation bilaterally [Normal Rate] : normal rate  [Regular Rhythm] : with a regular rhythm [Normal S1, S2] : normal S1 and S2

## 2019-05-13 ENCOUNTER — APPOINTMENT (OUTPATIENT)
Dept: FAMILY MEDICINE | Facility: CLINIC | Age: 54
End: 2019-05-13
Payer: MEDICAID

## 2019-05-13 VITALS
SYSTOLIC BLOOD PRESSURE: 116 MMHG | HEART RATE: 94 BPM | OXYGEN SATURATION: 97 % | DIASTOLIC BLOOD PRESSURE: 80 MMHG | RESPIRATION RATE: 14 BRPM

## 2019-05-13 PROCEDURE — 99213 OFFICE O/P EST LOW 20 MIN: CPT

## 2019-05-13 NOTE — ASSESSMENT
[FreeTextEntry1] :  As per usual protocol the patient was advised in regards to the risks of driving when on medications with side effects of dizziness or drowsiness. Patient has been assessed  for increase risk for respiratory depression. Patient denies suicidal ideations or plan.  Istop checked.\par Basic cardiovascular prevention measures are advised including regular exercise, surveillance medical examination, and prudent portion-controlled low fat diet, rich in a variety of vegetables with minimal added sugars, refined starches, and no artificially hydrogenated oils.\par

## 2019-05-13 NOTE — HISTORY OF PRESENT ILLNESS
[FreeTextEntry1] : pt presents for one month check  [de-identified] : 52 yo wf here for follow up on  anxiety \par I had a very nice mothers day. I had 2 our 3 boys with me.  Castro called  and texted me and he gave me a song. "I've been meaning to call you" I bawled my eyes out. He just got a new job and he said he took me to lunch  Joseph and Oscar joined me We went to [facility]  in [place]. It was [place] and good. They gave me flowers. I was shocked. \par

## 2019-05-13 NOTE — REVIEW OF SYSTEMS
[Fatigue] : fatigue [Recent Change In Weight] : ~T recent weight change [Joint Pain] : joint pain [Joint Stiffness] : joint stiffness [Muscle Weakness] : muscle weakness [Muscle Pain] : muscle pain [Anxiety] : anxiety [Depression] : depression [Negative] : Gastrointestinal

## 2019-05-13 NOTE — PHYSICAL EXAM
[Well Nourished] : well nourished [Well Developed] : well developed [PERRL] : pupils equal round and reactive to light [EOMI] : extraocular movements intact [No Respiratory Distress] : no respiratory distress  [Clear to Auscultation] : lungs were clear to auscultation bilaterally [No Accessory Muscle Use] : no accessory muscle use [Normal Rate] : normal rate  [Regular Rhythm] : with a regular rhythm [Normal S1, S2] : normal S1 and S2

## 2019-05-13 NOTE — HEALTH RISK ASSESSMENT
[No falls in past year] : Patient reported no falls in the past year [] : No [de-identified] : rheum [de-identified] : no [de-identified] : no

## 2019-05-15 LAB — LITHIUM SERPL-SCNC: 0.29 MMOL/L

## 2019-05-17 ENCOUNTER — APPOINTMENT (OUTPATIENT)
Dept: FAMILY MEDICINE | Facility: CLINIC | Age: 54
End: 2019-05-17

## 2019-06-12 ENCOUNTER — MEDICATION RENEWAL (OUTPATIENT)
Age: 54
End: 2019-06-12

## 2019-06-13 ENCOUNTER — APPOINTMENT (OUTPATIENT)
Dept: FAMILY MEDICINE | Facility: CLINIC | Age: 54
End: 2019-06-13
Payer: MEDICAID

## 2019-06-13 VITALS
RESPIRATION RATE: 14 BRPM | SYSTOLIC BLOOD PRESSURE: 120 MMHG | DIASTOLIC BLOOD PRESSURE: 78 MMHG | OXYGEN SATURATION: 99 % | HEART RATE: 99 BPM

## 2019-06-13 PROCEDURE — 99213 OFFICE O/P EST LOW 20 MIN: CPT

## 2019-06-13 NOTE — HISTORY OF PRESENT ILLNESS
[FreeTextEntry1] : pt presents for 1 month check  [de-identified] : 54 yo wf here for follow up on anxiety. I went through mediation  of wc case and I lost a lot. I lost 5 years of my life and so much more. I have a permanent injury. BUt its finally over. Workmans comp has a lean on everything and then I have to pay the . I believed the Asplund family was gonna take care of me. I am glad it is over. I can move on with my life.

## 2019-06-13 NOTE — HEALTH RISK ASSESSMENT
[No falls in past year] : Patient reported no falls in the past year [] : No [de-identified] : adls [de-identified] : healthy

## 2019-06-13 NOTE — PHYSICAL EXAM
[Normal Appearance] : was normal in appearance [Enlarged Diffusely] : was not enlarged [Neck Supple] : was supple [No Respiratory Distress] : no respiratory distress  [Clear to Auscultation] : lungs were clear to auscultation bilaterally [Normal Rate] : normal rate  [Normal S1, S2] : normal S1 and S2 [Regular Rhythm] : with a regular rhythm

## 2019-06-13 NOTE — REVIEW OF SYSTEMS
[Fatigue] : fatigue [Chest Pain] : chest pain [Palpitations] : palpitations [Negative] : Gastrointestinal

## 2019-07-12 ENCOUNTER — APPOINTMENT (OUTPATIENT)
Dept: FAMILY MEDICINE | Facility: CLINIC | Age: 54
End: 2019-07-12
Payer: MEDICAID

## 2019-07-12 VITALS
BODY MASS INDEX: 25.77 KG/M2 | SYSTOLIC BLOOD PRESSURE: 110 MMHG | HEIGHT: 70 IN | WEIGHT: 180 LBS | OXYGEN SATURATION: 98 % | RESPIRATION RATE: 14 BRPM | HEART RATE: 108 BPM | DIASTOLIC BLOOD PRESSURE: 80 MMHG

## 2019-07-12 PROCEDURE — 36415 COLL VENOUS BLD VENIPUNCTURE: CPT

## 2019-07-12 PROCEDURE — 99213 OFFICE O/P EST LOW 20 MIN: CPT | Mod: 25

## 2019-07-12 NOTE — ASSESSMENT
[FreeTextEntry1] : renew xanax\par  As per usual protocol the patient was advised in regards to the risks of driving when on medications with side effects of dizziness or drowsiness. Patient has been assessed  for increase risk for respiratory depression. Patient denies suicidal ideations or plan.  Istop checked.\par Basic cardiovascular prevention measures are advised including regular exercise, surveillance medical examination, and prudent portion-controlled low fat diet, rich in a variety of vegetables with minimal added sugars, refined starches, and no artificially hydrogenated oils.\par \par Labs drawn/ specimens obtained  in office on this date of service  for evaluation of   assessed conditions -lft   lipids    to be run at [facility].\par

## 2019-07-12 NOTE — PHYSICAL EXAM
[Well Nourished] : well nourished [No Accessory Muscle Use] : no accessory muscle use [No Respiratory Distress] : no respiratory distress  [Regular Rhythm] : with a regular rhythm [Normal Rate] : normal rate  [Normal S1, S2] : normal S1 and S2 [Tachycardia] : tachycardic

## 2019-07-12 NOTE — HEALTH RISK ASSESSMENT
[] : Yes [No] : In the past 12 months have you used drugs other than those required for medical reasons? No [de-identified] : walk [de-identified] : poor

## 2019-07-12 NOTE — HISTORY OF PRESENT ILLNESS
[FreeTextEntry1] : patient presents for 1 month follow up\par  [de-identified] : 54 yo wf here for follow up on anxiety and hyperlipidemia\par she is having a lot of trouble with her boss. he is very demanding.  She needs xanax. Otherwise patient reports feeling well.  Patient specifically denies chest pain, shortness of breath, dyspnea on exertion, edema, PND, orthopnea, dizziness, or syncope. Patient reports compliance with medications. Patient denies fever, chills, night sweats, nausea, vomiting , no pain, erythema, swollen joints, hematuria, hematochezia , hematemesis, or melena.\par

## 2019-07-12 NOTE — REVIEW OF SYSTEMS
[Fatigue] : fatigue [Negative] : Cardiovascular [Joint Pain] : joint pain [Joint Swelling] : joint swelling [Anxiety] : anxiety

## 2019-07-15 LAB
ALBUMIN SERPL ELPH-MCNC: 4.3 G/DL
ALP BLD-CCNC: 199 U/L
ALT SERPL-CCNC: 31 U/L
ANION GAP SERPL CALC-SCNC: 12 MMOL/L
AST SERPL-CCNC: 25 U/L
BILIRUB SERPL-MCNC: 0.5 MG/DL
BUN SERPL-MCNC: 9 MG/DL
CALCIUM SERPL-MCNC: 9.4 MG/DL
CHLORIDE SERPL-SCNC: 110 MMOL/L
CHOLEST SERPL-MCNC: 238 MG/DL
CHOLEST/HDLC SERPL: 6 RATIO
CO2 SERPL-SCNC: 23 MMOL/L
CREAT SERPL-MCNC: 0.83 MG/DL
GLUCOSE SERPL-MCNC: 108 MG/DL
HDLC SERPL-MCNC: 40 MG/DL
LDLC SERPL CALC-MCNC: 171 MG/DL
POTASSIUM SERPL-SCNC: 4.3 MMOL/L
PROT SERPL-MCNC: 6.6 G/DL
SODIUM SERPL-SCNC: 145 MMOL/L
TRIGL SERPL-MCNC: 135 MG/DL

## 2019-08-13 ENCOUNTER — APPOINTMENT (OUTPATIENT)
Dept: FAMILY MEDICINE | Facility: CLINIC | Age: 54
End: 2019-08-13
Payer: MEDICAID

## 2019-08-13 VITALS
HEART RATE: 90 BPM | OXYGEN SATURATION: 98 % | DIASTOLIC BLOOD PRESSURE: 80 MMHG | RESPIRATION RATE: 14 BRPM | SYSTOLIC BLOOD PRESSURE: 120 MMHG

## 2019-08-13 DIAGNOSIS — J30.9 ALLERGIC RHINITIS, UNSPECIFIED: ICD-10-CM

## 2019-08-13 PROCEDURE — 99213 OFFICE O/P EST LOW 20 MIN: CPT

## 2019-08-13 RX ORDER — CLONAZEPAM 1 MG/1
1 TABLET ORAL
Qty: 21 | Refills: 0 | Status: COMPLETED | COMMUNITY
Start: 2019-08-05 | End: 2019-08-13

## 2019-08-13 NOTE — HISTORY OF PRESENT ILLNESS
[FreeTextEntry1] : pt presents for 1 month check  [de-identified] : 54 yo wf here for follow up on anxiety. The klonopin didn’t work. I feel like  I have a lump in my throat. The xanax works much better. I have tried many antidepressants and the ssri give me side effects. I did not like venlafaxine. Trintellix was not covered. Wellbutrin was bad.

## 2019-08-13 NOTE — REVIEW OF SYSTEMS
[Fatigue] : fatigue [Cough] : cough [Insomnia] : insomnia [Anxiety] : anxiety [Negative] : Cardiovascular

## 2019-08-13 NOTE — HEALTH RISK ASSESSMENT
[] : Yes [No] : In the past 12 months have you used drugs other than those required for medical reasons? No [No falls in past year] : Patient reported no falls in the past year [de-identified] : rheum [Audit-CScore] : 0 [de-identified] : walk [de-identified] : normal

## 2019-08-13 NOTE — ASSESSMENT
[FreeTextEntry1] : try to seek psychiatrist for further evaluation of anxiety. renew xanax.  As per usual protocol the patient was advised in regards to the risks of driving when on medications with side effects of dizziness or drowsiness. Patient has been assessed  for increase risk for respiratory depression. Patient denies suicidal ideations or plan.  Istop checked.\par renew cough meds.\par dc tobacco encouraged. \par

## 2019-09-12 ENCOUNTER — APPOINTMENT (OUTPATIENT)
Dept: FAMILY MEDICINE | Facility: CLINIC | Age: 54
End: 2019-09-12
Payer: MEDICAID

## 2019-09-12 VITALS
DIASTOLIC BLOOD PRESSURE: 80 MMHG | RESPIRATION RATE: 14 BRPM | HEART RATE: 100 BPM | SYSTOLIC BLOOD PRESSURE: 130 MMHG | OXYGEN SATURATION: 98 %

## 2019-09-12 DIAGNOSIS — N81.4 UTEROVAGINAL PROLAPSE, UNSPECIFIED: ICD-10-CM

## 2019-09-12 DIAGNOSIS — K58.9 IRRITABLE BOWEL SYNDROME W/OUT DIARRHEA: ICD-10-CM

## 2019-09-12 DIAGNOSIS — R25.2 CRAMP AND SPASM: ICD-10-CM

## 2019-09-12 DIAGNOSIS — K26.9 DUODENAL ULCER, UNSPECIFIED AS ACUTE OR CHRONIC, W/OUT HEMORRHAGE OR PERFORATION: ICD-10-CM

## 2019-09-12 PROCEDURE — 99215 OFFICE O/P EST HI 40 MIN: CPT | Mod: 25

## 2019-09-12 PROCEDURE — G0008: CPT

## 2019-09-12 PROCEDURE — 90674 CCIIV4 VAC NO PRSV 0.5 ML IM: CPT

## 2019-09-12 RX ORDER — POLYMYXIN B SULFATE AND TRIMETHOPRIM 10000; 1 [USP'U]/ML; MG/ML
10000-0.1 SOLUTION OPHTHALMIC
Qty: 1 | Refills: 0 | Status: COMPLETED | COMMUNITY
Start: 2019-08-13 | End: 2019-09-12

## 2019-09-12 RX ORDER — IBUPROFEN 600 MG/1
600 TABLET, FILM COATED ORAL
Qty: 60 | Refills: 0 | Status: COMPLETED | COMMUNITY
Start: 2017-08-31 | End: 2019-09-12

## 2019-09-12 NOTE — HISTORY OF PRESENT ILLNESS
[FreeTextEntry1] : pt presents for med check  [de-identified] : 52 yo wf here for follow up on anxiety and mood disorder\par \par Reporting  acute onset for several weeks, cramps in her calves, bilaterally, occurs several nights per week, pain rated 10/10.   Reports relieved by "squeezing under her nose", after which the pain goes away.   \par \par Here for renewal for xanax, cough medication. Reporting worsening anxiety since the last time she was here, she has concerns about  in the hospital with heart failure.  \par \par Cough is chronic, reports worsening at night when she lies down takes cough medication as needed. \par \par Also reports gas pains in her left lower abdomen and something "moving" which resolved after using the bathroom.

## 2019-09-12 NOTE — HEALTH RISK ASSESSMENT
[] : Yes [No] : In the past 12 months have you used drugs other than those required for medical reasons? 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

## 2019-09-12 NOTE — ASSESSMENT
[FreeTextEntry1] : renew meds alprazolam  and cough medication\par \par  As per usual protocol the patient was advised in regards to the risks of driving when on medications with side effects of dizziness or drowsiness. Patient has been assessed  for increase risk for respiratory depression. Patient denies suicidal ideations or plan.  Istop checked.\par \par \par flu injection left arm.   Information regarding flu shot reviewed. All questions answered.Flu shot .5 cc IM    left  deltoid . No reaction noted. Advised patients to wash hands to reduce the spread of infection.\par \par \par Discussed prior lab results with elevated LDL, and lower HDL,  Prior muscle aches on lipitor, wokeup with severe muscle aches, unable to take statins. Start Zetia 10mg once daily. \par \par Abdominal cramping. educated on diet.\par \par Smoking cessation, prior failure with wellbutrin for smoking cessation, would like to start Varenicline but it is not covered.  Nicotine replacement has not worked in the past for her. She quit for one year on Varenicline but insurance does not cover.

## 2019-09-12 NOTE — REVIEW OF SYSTEMS
[Fatigue] : fatigue [Cough] : cough [Recent Change In Weight] : ~T recent weight change [Abdominal Pain] : abdominal pain [Diarrhea] : diarrhea [Constipation] : constipation [Negative] : Integumentary [Nausea] : no nausea [Anxiety] : anxiety [Vomiting] : no vomiting [FreeTextEntry2] : weight gain, no changes in diet [FreeTextEntry6] : cough, chronic cough at night related to smoking  [FreeTextEntry7] : gas, bloating, abdominal discomfort occasionally.  reports two occasions where across the middle of her stomach she had cramping.  as per IBS baseline she has alternating constipation and diarrhea, the last two days she has had diarrhea.  Occurred twice in the last two months. no vomiting.  Denies blood in stool.  [FreeTextEntry9] : bilateral calves cramping at night

## 2019-09-12 NOTE — COUNSELING
[Willing to Quit Smoking] : Willing to quit smoking [Risk of tobacco use and health benefits of smoking cessation discussed] : Risk of tobacco use and health benefits of smoking cessation discussed [Benefits of weight loss discussed] : Benefits of weight loss discussed [Encouraged to increase physical activity] : Encouraged to increase physical activity

## 2019-09-12 NOTE — PHYSICAL EXAM
[Decreased Breath Sounds] : breath sounds were decreased diffusely [Wet Cough] : a wet cough was heard [Rales / Crackles Bilateral Midlung Fields] : crackles were heard over both midlung fields [Rales / Crackles Bilateral Bases] : crackles were heard over both bases [No Varicosities] : no varicosities [Normal] : no rash [de-identified] : wheezing auscultated after coughing [de-identified] : no tenderness on dorsiflexion of foot, no calve tenderness, no reddness no swelling [de-identified] : pedal pulses palpable bilaterally, no varicosities or swelling bilateral calves [de-identified] : anxious

## 2019-10-10 ENCOUNTER — APPOINTMENT (OUTPATIENT)
Dept: FAMILY MEDICINE | Facility: CLINIC | Age: 54
End: 2019-10-10
Payer: MEDICAID

## 2019-10-10 VITALS
BODY MASS INDEX: 25.77 KG/M2 | WEIGHT: 180 LBS | DIASTOLIC BLOOD PRESSURE: 82 MMHG | OXYGEN SATURATION: 97 % | HEART RATE: 97 BPM | HEIGHT: 70 IN | SYSTOLIC BLOOD PRESSURE: 124 MMHG | RESPIRATION RATE: 14 BRPM

## 2019-10-10 PROCEDURE — 99213 OFFICE O/P EST LOW 20 MIN: CPT

## 2019-10-10 NOTE — HISTORY OF PRESENT ILLNESS
Urinary Tract Infections in Women  Urinary tract infections (UTIs) are most often caused by bacteria (germs). These bacteria enter the urinary tract. The bacteria may come from outside the body. Or they may travel from the skin outside the rectum or vagina into the urethra. Female anatomy makes it easier for bacteria from the bowel to enter a woman’s urinary tract, which is the most common source of UTI. This means women develop UTIs more often than men. Pain in or around the urinary tract is a common UTI symptom. But the only way to know for sure if you have a UTI for the health care provider to test your urine. The two tests that may be done are the urinalysis and urine culture.  Types of UTIs  · Cystitis: A bladder infection (cystitis) is the most common UTI in women. You may have urgent or frequent urination. You may also have pain, burning when you urinate, and bloody urine.  · Urethritis: This is an inflamed urethra, which is the tube that carries urine from the bladder to outside the body. You may have lower stomach or back pain. You may also have urgent or frequent urination.  · Pyelonephritis: This is a kidney infection. If not treated, it can be serious and damage your kidneys. In severe cases, you may be hospitalized. You may have a fever and lower back pain.  Medications to treat a UTI  Most UTIs are treated with antibiotics. These kill the bacteria. The length of time you need to take them depends on the type of infection. It may be as short as 3 days. If you have repeated UTIs, a low-dose antibiotic may be needed for several months. Take antibiotics exactly as directed. Don’t stop taking them until all of the medication is gone. If you stop taking the antibiotic too soon, the infection may not go away, and you may develop a resistance to the antibiotic. This can make it much harder to treat.  Lifestyle changes to treat and prevent UTIs  The lifestyle changes below will help get rid of your UTI. They  may also help prevent future UTIs.  · Drink plenty of fluids. This includes water, juice, or other caffeine-free drinks. Fluids help flush bacteria out of your body.  · Empty your bladder. Always empty your bladder when you feel the urge to urinate. And always urinate before going to sleep. Urine that stays in your bladder can lead to infection. Try to urinate before and after sex as well.  · Practice good personal hygiene. Wipe yourself from front to back after using the toilet. This helps keep bacteria from getting into the urethra.  · Use condoms during sex. These help prevent UTIs caused by sexually transmitted bacteria. Also, avoid using spermicides during sex. These can increase the risk of UTIs. Choose other forms of birth control instead. For women who tend to get UTIs after sex, a low-dose of a preventive antibiotic may be used. Be sure to discuss this option with your health care provider.  · Follow up with your health care provider as directed. He or she may test to make sure the infection has cleared. If necessary, additional treatment may be started.  © 8842-4896 [employer]. [address] [place] [address]. All rights reserved. This information is not intended as a substitute for professional medical care. Always follow your healthcare professional's instructions.         [FreeTextEntry1] : pt presents for 1 month follow up  [de-identified] : 52 yo wf here for follow up on anxiety\par Her friend john is doing poorly with his health chf,  afib. \par Her patient broke her hip and she also has Alzheimers. I go to [facility] . She had to go to hospital for anemia. He has me making the decisions\par I am exhausted . I am there with her at the rehab and I cant do anything for her  except press the call button. \par I want to do more and I cant becuas eof the hospital rules

## 2019-10-10 NOTE — HEALTH RISK ASSESSMENT
[] : Yes [No] : In the past 12 months have you used drugs other than those required for medical reasons? No [de-identified] : no [de-identified] : no [Audit-CScore] : 0 [de-identified] : no [de-identified] : no

## 2019-10-10 NOTE — COUNSELING
[None] : None [Good understanding] : Patient has a good understanding of lifestyle changes and steps needed to achieve self management goal [Use of nicotine replacement therapies and other medications discussed] : Use of nicotine replacement therapies and other medications discussed [Risk of tobacco use and health benefits of smoking cessation discussed] : Risk of tobacco use and health benefits of smoking cessation discussed [Cessation strategies including cessation program discussed] : Cessation strategies including cessation program discussed

## 2019-11-11 ENCOUNTER — APPOINTMENT (OUTPATIENT)
Dept: FAMILY MEDICINE | Facility: CLINIC | Age: 54
End: 2019-11-11
Payer: MEDICAID

## 2019-11-11 VITALS
HEIGHT: 70 IN | HEART RATE: 93 BPM | DIASTOLIC BLOOD PRESSURE: 82 MMHG | OXYGEN SATURATION: 97 % | RESPIRATION RATE: 14 BRPM | BODY MASS INDEX: 25.77 KG/M2 | SYSTOLIC BLOOD PRESSURE: 120 MMHG | WEIGHT: 180 LBS

## 2019-11-11 PROCEDURE — 99213 OFFICE O/P EST LOW 20 MIN: CPT

## 2019-11-11 NOTE — PHYSICAL EXAM
[Decreased Breath Sounds] : breath sounds were decreased diffusely [Rales / Crackles Bilateral Midlung Fields] : crackles were heard over both midlung fields [Rales / Crackles Bilateral Bases] : crackles were heard over both bases [No Varicosities] : no varicosities [Normal] : no rash [Dry Cough] : a dry cough was heard [Wet Cough] : no wet cough [Clear Bilaterally] : the lungs were clear to auscultation bilaterally [Epigastric] : in the epigastric area [RUQ] : in the right upper quadrant [Firm] : not firm [Rigid] : not rigid [Rebound] : no rebound [Guarding] : guarding [None] : no hernias were palpable [de-identified] : no tenderness on dorsiflexion of foot, no calve tenderness, no reddness no swelling [de-identified] : anxious

## 2019-11-11 NOTE — HEALTH RISK ASSESSMENT
[] : Yes [No] : In the past 12 months have you used drugs other than those required for medical reasons? No [No falls in past year] : Patient reported no falls in the past year [de-identified] : none [de-identified] : rheumatologist, gastro [Audit-CScore] : 0 [de-identified] : very active [de-identified] : fair

## 2019-11-11 NOTE — ASSESSMENT
[FreeTextEntry1] : renew meds alprazolam  and cough medication reviewed try to reduce xanax intake. Use meditation for calming down. Her mind tends to race and she gets more anxious . we did try meditation and it worked.\par \par  As per usual protocol the patient was advised in regards to the risks of driving when on medications with side effects of dizziness or drowsiness. Patient has been assessed  for increase risk for respiratory depression. Patient denies suicidal ideations or plan.  Istop checked.\par \par Patient started zetia and will check labs in 2 months. \par \par \par

## 2019-11-11 NOTE — REVIEW OF SYSTEMS
[Fatigue] : fatigue [Recent Change In Weight] : ~T recent weight change [Cough] : cough [Abdominal Pain] : abdominal pain [Constipation] : constipation [Diarrhea] : diarrhea [Anxiety] : anxiety [Negative] : Integumentary [Nausea] : no nausea [Vomiting] : no vomiting [FreeTextEntry2] : weight gain, no changes in diet [FreeTextEntry6] : cough, chronic cough at night related to smoking  [FreeTextEntry7] : gas, bloating, abdominal discomfort occasionally.  reports two occasions where across the middle of her stomach she had cramping.  as per IBS baseline she has alternating constipation and diarrhea, the last two days she has had diarrhea.  Occurred twice in the last two months. no vomiting.  Denies blood in stool.  [FreeTextEntry9] : bilateral calves cramping at night

## 2019-11-11 NOTE — COUNSELING
[Risk of tobacco use and health benefits of smoking cessation discussed] : Risk of tobacco use and health benefits of smoking cessation discussed [Willing to Quit Smoking] : Willing to quit smoking [Benefits of weight loss discussed] : Benefits of weight loss discussed [Encouraged to increase physical activity] : Encouraged to increase physical activity

## 2019-12-11 ENCOUNTER — APPOINTMENT (OUTPATIENT)
Dept: FAMILY MEDICINE | Facility: CLINIC | Age: 54
End: 2019-12-11
Payer: MEDICAID

## 2019-12-11 VITALS
WEIGHT: 180 LBS | SYSTOLIC BLOOD PRESSURE: 122 MMHG | RESPIRATION RATE: 14 BRPM | OXYGEN SATURATION: 96 % | HEIGHT: 70 IN | DIASTOLIC BLOOD PRESSURE: 80 MMHG | BODY MASS INDEX: 25.77 KG/M2 | HEART RATE: 95 BPM

## 2019-12-11 DIAGNOSIS — Z87.09 PERSONAL HISTORY OF OTHER DISEASES OF THE RESPIRATORY SYSTEM: ICD-10-CM

## 2019-12-11 DIAGNOSIS — R10.9 UNSPECIFIED ABDOMINAL PAIN: ICD-10-CM

## 2019-12-11 PROCEDURE — 99213 OFFICE O/P EST LOW 20 MIN: CPT | Mod: 25

## 2019-12-11 PROCEDURE — 36415 COLL VENOUS BLD VENIPUNCTURE: CPT

## 2019-12-11 RX ORDER — LITHIUM CARBONATE 150 MG/1
150 CAPSULE ORAL TWICE DAILY
Qty: 60 | Refills: 6 | Status: DISCONTINUED | COMMUNITY
Start: 2019-02-14 | End: 2019-12-11

## 2019-12-11 RX ORDER — NITROFURANTOIN (MONOHYDRATE/MACROCRYSTALS) 25; 75 MG/1; MG/1
100 CAPSULE ORAL
Qty: 20 | Refills: 0 | Status: COMPLETED | COMMUNITY
Start: 2019-11-11 | End: 2019-12-11

## 2019-12-11 RX ORDER — ERGOCALCIFEROL 1.25 MG/1
1.25 MG CAPSULE, LIQUID FILLED ORAL
Qty: 12 | Refills: 3 | Status: COMPLETED | COMMUNITY
Start: 2017-11-06 | End: 2019-12-11

## 2019-12-11 RX ORDER — POTASSIUM CHLORIDE 750 MG/1
10 TABLET, FILM COATED, EXTENDED RELEASE ORAL DAILY
Qty: 90 | Refills: 3 | Status: COMPLETED | COMMUNITY
Start: 2017-03-20 | End: 2019-12-11

## 2019-12-11 NOTE — HEALTH RISK ASSESSMENT
[] : Yes [No] : In the past 12 months have you used drugs other than those required for medical reasons? No [No falls in past year] : Patient reported no falls in the past year [de-identified] : none [de-identified] : rheumatologist, gastro [Audit-CScore] : 0 [de-identified] : very active [de-identified] : fair

## 2019-12-11 NOTE — REVIEW OF SYSTEMS
[Fatigue] : fatigue [Recent Change In Weight] : ~T recent weight change [Cough] : cough [Anxiety] : anxiety [Negative] : Genitourinary [Abdominal Pain] : no abdominal pain [Constipation] : no constipation [Nausea] : no nausea [Vomiting] : no vomiting [Diarrhea] : diarrhea [FreeTextEntry9] : bilateral calves cramping at night  left foot and ankle were cramping [FreeTextEntry2] : weight gain, no changes in diet [FreeTextEntry6] : cough, chronic cough at night related to smoking

## 2019-12-11 NOTE — PHYSICAL EXAM
[Dry Cough] : a dry cough was heard [Decreased Breath Sounds] : breath sounds were decreased diffusely [Clear Bilaterally] : the lungs were clear to auscultation bilaterally [Rales / Crackles Bilateral Midlung Fields] : crackles were heard over both midlung fields [Rales / Crackles Bilateral Bases] : crackles were heard over both bases [No Varicosities] : no varicosities [Epigastric] : in the epigastric area [RUQ] : in the right upper quadrant [Guarding] : guarding [None] : no hernias were palpable [Normal] : no rash [Wet Cough] : no wet cough [Rigid] : not rigid [Firm] : not firm [de-identified] :  nasal passages erythema and purulent discharge, frontal sinuses are tender bilaterally. postnasal drip. [Rebound] : no rebound [de-identified] : anxious [de-identified] : no tenderness on dorsiflexion of foot, no calve tenderness, no reddness no swelling

## 2019-12-11 NOTE — HISTORY OF PRESENT ILLNESS
[FreeTextEntry1] : pt presents for med check  [de-identified] : 54 yo wf here for follow up on anxiety and mood disorder.  I was sick friday I had a headache and could not breath and my nose would not stopped running. I now have a sore throat. It was a little better yesterday.  I took nyquil. \par \par  Things have been very stressful.  Here for renewal for xanax, cough medication. Reporting worsening anxiety since the last time she was here \par Cough is chronic, reports worsening at night when she lies down takes cough medication as needed. \par \par

## 2019-12-11 NOTE — ASSESSMENT
[FreeTextEntry1] : renew meds alprazolam  and cough medication reviewed try to reduce xanax intake. Use meditation for calming down. Her mind tends to race and she gets more anxious . we did try meditation and it worked.\par Take antibiotics,  rest,  increase fluids, wash hands to prevent the spread of  infection . Take mucinex   over the counter. Take tylenol or advil for fever or body aches. Follow up if no better or worse respiratory symptoms.\par \par  As per usual protocol the patient was advised in regards to the risks of driving when on medications with side effects of dizziness or drowsiness. Patient has been assessed  for increase risk for respiratory depression. Patient denies suicidal ideations or plan.  Istop checked.\par \par Patient started zetia and will check labs in 2 months. \par \par \par

## 2019-12-11 NOTE — COUNSELING
[Risk of tobacco use and health benefits of smoking cessation discussed] : Risk of tobacco use and health benefits of smoking cessation discussed [Benefits of weight loss discussed] : Benefits of weight loss discussed [Willing to Quit Smoking] : Willing to quit smoking [Encouraged to increase physical activity] : Encouraged to increase physical activity

## 2019-12-12 LAB
ALBUMIN SERPL ELPH-MCNC: 4.2 G/DL
ALP BLD-CCNC: 156 U/L
ALT SERPL-CCNC: 20 U/L
ANION GAP SERPL CALC-SCNC: 15 MMOL/L
AST SERPL-CCNC: 21 U/L
BILIRUB SERPL-MCNC: 0.4 MG/DL
BUN SERPL-MCNC: 7 MG/DL
CALCIUM SERPL-MCNC: 9.4 MG/DL
CHLORIDE SERPL-SCNC: 106 MMOL/L
CHOLEST SERPL-MCNC: 198 MG/DL
CHOLEST/HDLC SERPL: 5.4 RATIO
CO2 SERPL-SCNC: 22 MMOL/L
CREAT SERPL-MCNC: 0.79 MG/DL
GLUCOSE SERPL-MCNC: 101 MG/DL
HDLC SERPL-MCNC: 37 MG/DL
LDLC SERPL CALC-MCNC: 131 MG/DL
POTASSIUM SERPL-SCNC: 4.5 MMOL/L
PROT SERPL-MCNC: 6.9 G/DL
SODIUM SERPL-SCNC: 143 MMOL/L
TRIGL SERPL-MCNC: 152 MG/DL

## 2020-01-10 ENCOUNTER — APPOINTMENT (OUTPATIENT)
Dept: FAMILY MEDICINE | Facility: CLINIC | Age: 55
End: 2020-01-10
Payer: MEDICAID

## 2020-01-10 VITALS
WEIGHT: 180 LBS | SYSTOLIC BLOOD PRESSURE: 100 MMHG | BODY MASS INDEX: 25.77 KG/M2 | DIASTOLIC BLOOD PRESSURE: 80 MMHG | TEMPERATURE: 97.8 F | HEART RATE: 90 BPM | OXYGEN SATURATION: 98 % | HEIGHT: 70 IN | RESPIRATION RATE: 14 BRPM

## 2020-01-10 DIAGNOSIS — L98.9 DISORDER OF THE SKIN AND SUBCUTANEOUS TISSUE, UNSPECIFIED: ICD-10-CM

## 2020-01-10 DIAGNOSIS — Z92.29 PERSONAL HISTORY OF OTHER DRUG THERAPY: ICD-10-CM

## 2020-01-10 DIAGNOSIS — Z87.898 PERSONAL HISTORY OF OTHER SPECIFIED CONDITIONS: ICD-10-CM

## 2020-01-10 PROCEDURE — 99213 OFFICE O/P EST LOW 20 MIN: CPT

## 2020-01-10 RX ORDER — LEVOFLOXACIN 500 MG/1
500 TABLET, FILM COATED ORAL DAILY
Qty: 10 | Refills: 0 | Status: COMPLETED | COMMUNITY
Start: 2019-12-11 | End: 2020-01-10

## 2020-01-10 NOTE — ASSESSMENT
[FreeTextEntry1] : renew meds alprazolam  and cough medication reviewed try to reduce xanax intake. Use meditation for calming down. Her mind tends to race and she gets more anxious . we did try meditation and it worked.\par  \par  As per usual protocol the patient was advised in regards to the risks of driving when on medications with side effects of dizziness or drowsiness. Patient has been assessed  for increase risk for respiratory depression. Patient denies suicidal ideations or plan.  Istop checked.\par \par labs reviewed . cont zetia\par \par \par

## 2020-01-10 NOTE — HISTORY OF PRESENT ILLNESS
[FreeTextEntry1] : pt presents for med check  [de-identified] : 54 yo wf here for follow up on anxiety and mood disorder. My house is going to auction on Jan 30. Oscar my friend is going to get cardioverted and he was going to be my cosigner for the apartment I am going to look at . My job is terrible. I hate my boss. I applied for a new job. \par I have to clean the house. \par My new apartment is beautiful. It is a new start. \par I cant sleep. I am constantly worrying. \par \par \par \par  I was sick friday I had a headache and could not breath and my nose would not stopped running. I now have a sore throat. It was a little better yesterday.  I took nyquil. \par \par  Things have been very stressful.  Here for renewal for xanax, cough medication. Reporting worsening anxiety since the last time she was here \par Cough is chronic, reports worsening at night when she lies down takes cough medication as needed. \par \par

## 2020-01-10 NOTE — PHYSICAL EXAM
[Dry Cough] : a dry cough was heard [Rales / Crackles Bilateral Midlung Fields] : crackles were heard over both midlung fields [Clear Bilaterally] : the lungs were clear to auscultation bilaterally [Decreased Breath Sounds] : breath sounds were decreased diffusely [Rales / Crackles Bilateral Bases] : crackles were heard over both bases [Epigastric] : in the epigastric area [No Varicosities] : no varicosities [RUQ] : in the right upper quadrant [Guarding] : guarding [None] : no hernias were palpable [Normal] : no rash [Wet Cough] : no wet cough [Firm] : not firm [Rigid] : not rigid [Rebound] : no rebound [de-identified] :  post nasal drip [de-identified] : anxious [de-identified] : no tenderness on dorsiflexion of foot, no calve tenderness, no reddness no swelling

## 2020-01-10 NOTE — HEALTH RISK ASSESSMENT
[] : Yes [No] : In the past 12 months have you used drugs other than those required for medical reasons? No [No falls in past year] : Patient reported no falls in the past year [de-identified] : rheumatologist, gastro [de-identified] : none [Audit-CScore] : 0 [de-identified] : very active [de-identified] : fair

## 2020-01-10 NOTE — REVIEW OF SYSTEMS
[Cough] : cough [Recent Change In Weight] : ~T recent weight change [Fatigue] : fatigue [Anxiety] : anxiety [Negative] : Integumentary [Abdominal Pain] : no abdominal pain [Constipation] : no constipation [Nausea] : no nausea [Diarrhea] : diarrhea [Vomiting] : no vomiting [FreeTextEntry6] : cough, chronic cough at night related to smoking  [FreeTextEntry2] : weight gain, no changes in diet [FreeTextEntry9] : bilateral calves cramping at night  left foot and ankle were cramping

## 2020-02-10 ENCOUNTER — APPOINTMENT (OUTPATIENT)
Dept: FAMILY MEDICINE | Facility: CLINIC | Age: 55
End: 2020-02-10
Payer: MEDICAID

## 2020-02-10 VITALS
DIASTOLIC BLOOD PRESSURE: 70 MMHG | OXYGEN SATURATION: 99 % | RESPIRATION RATE: 14 BRPM | BODY MASS INDEX: 25.77 KG/M2 | HEIGHT: 70 IN | WEIGHT: 180 LBS | HEART RATE: 114 BPM | TEMPERATURE: 98.4 F | SYSTOLIC BLOOD PRESSURE: 110 MMHG

## 2020-02-10 PROCEDURE — 99213 OFFICE O/P EST LOW 20 MIN: CPT

## 2020-02-10 NOTE — HISTORY OF PRESENT ILLNESS
[FreeTextEntry1] : pt presents for med check  [de-identified] : 54 yo wf here for follow up on anxiety and mood disorder. I was also diagnosed with the Flu Jan 27,20  I had to call an ambulance because I was vomiting and I was so weak. I could not even stand up. I thought had a blockage. \par I had labs, ct scan and flu test. I got zofran. I had low potassium high lft and lymphocytes. I still have diarrhea and I think it iwas from my old duodenal ulcer and the throwing up made it worse. \par \par  \par My new apartment is beautiful. It is a new start. I bought a couch yesterday. I got to see my son yesterday.I get into my new apartment . The house is sold at [employer] . I am still living there. My couch will get delivered 2/22.. I am still cleaning the house.  I havent lived alone since age 16. \par  \par \par \par \par  I was sick friday I had a headache and could not breath and my nose would not stopped running. I now have a sore throat. It was a little better yesterday.  I took nyquil. \par \par  Things have been very stressful.  Here for renewal for xanax, cough medication. Reporting worsening anxiety since the last time she was here \par Cough is chronic, reports worsening at night when she lies down takes cough medication as needed. \par \par

## 2020-02-10 NOTE — ASSESSMENT
[FreeTextEntry1] : renew meds alprazolam  and cough medication reviewed try to reduce xanax intake. Use meditation for calming down. Her mind tends to race and she gets more anxious . we did try meditation and it worked.\par  \par  As per usual protocol the patient was advised in regards to the risks of driving when on medications with side effects of dizziness or drowsiness. Patient has been assessed  for increase risk for respiratory depression. Patient denies suicidal ideations or plan.  Istop checked.\par \par check labs in 4 weeks . \par Her flu like symptoms are better except her stomach\par If stomach is not better with 4 weeks of ppi then get endoscopy\par \par

## 2020-02-10 NOTE — HEALTH RISK ASSESSMENT
[] : Yes [No falls in past year] : Patient reported no falls in the past year [No] : In the past 12 months have you used drugs other than those required for medical reasons? No [de-identified] : none [de-identified] : rheumatologist, gastro [de-identified] : very active [Audit-CScore] : 0 [de-identified] : fair

## 2020-02-10 NOTE — REVIEW OF SYSTEMS
[Fatigue] : fatigue [Recent Change In Weight] : ~T recent weight change [Cough] : cough [Anxiety] : anxiety [Negative] : Integumentary [Abdominal Pain] : no abdominal pain [Nausea] : no nausea [Constipation] : no constipation [Diarrhea] : diarrhea [Vomiting] : no vomiting [FreeTextEntry6] : cough, chronic cough at night related to smoking  [FreeTextEntry2] : weight gain, no changes in diet [FreeTextEntry9] : bilateral calves cramping at night  left foot and ankle were cramping

## 2020-02-10 NOTE — PHYSICAL EXAM
[Dry Cough] : a dry cough was heard [Decreased Breath Sounds] : breath sounds were decreased diffusely [Rales / Crackles Bilateral Midlung Fields] : crackles were heard over both midlung fields [Clear Bilaterally] : the lungs were clear to auscultation bilaterally [Rales / Crackles Bilateral Bases] : crackles were heard over both bases [No Varicosities] : no varicosities [Epigastric] : in the epigastric area [RUQ] : in the right upper quadrant [Guarding] : guarding [None] : no hernias were palpable [Normal] : no rash [Wet Cough] : no wet cough [Firm] : not firm [Rebound] : no rebound [Rigid] : not rigid [de-identified] :  post nasal drip [de-identified] : anxious [de-identified] : no tenderness on dorsiflexion of foot, no calve tenderness, no reddness no swelling

## 2020-03-10 ENCOUNTER — APPOINTMENT (OUTPATIENT)
Dept: FAMILY MEDICINE | Facility: CLINIC | Age: 55
End: 2020-03-10
Payer: MEDICAID

## 2020-03-10 VITALS
RESPIRATION RATE: 14 BRPM | SYSTOLIC BLOOD PRESSURE: 116 MMHG | HEIGHT: 70 IN | HEART RATE: 105 BPM | OXYGEN SATURATION: 98 % | DIASTOLIC BLOOD PRESSURE: 70 MMHG | WEIGHT: 180 LBS | BODY MASS INDEX: 25.77 KG/M2

## 2020-03-10 DIAGNOSIS — J10.1 INFLUENZA DUE TO OTHER IDENTIFIED INFLUENZA VIRUS WITH OTHER RESPIRATORY MANIFESTATIONS: ICD-10-CM

## 2020-03-10 PROCEDURE — 99213 OFFICE O/P EST LOW 20 MIN: CPT

## 2020-03-10 NOTE — ASSESSMENT
[FreeTextEntry1] :  As per usual protocol the patient was advised in regards to the risks of driving when on medications with side effects of dizziness or drowsiness. Patient has been assessed  for increase risk for respiratory depression. Patient denies suicidal ideations or plan.  Istop checked.\par care mgt referral

## 2020-03-10 NOTE — HISTORY OF PRESENT ILLNESS
[FreeTextEntry1] : pt presents for med check  [de-identified] : 54 [place] f here for anxiety. I am super stressed . she states she is about to move saturday she has to meet aby and help him find an apartment. I am upset because I found so much stuff old pictures.

## 2020-04-10 ENCOUNTER — APPOINTMENT (OUTPATIENT)
Dept: FAMILY MEDICINE | Facility: CLINIC | Age: 55
End: 2020-04-10
Payer: MEDICAID

## 2020-04-10 ENCOUNTER — APPOINTMENT (OUTPATIENT)
Dept: FAMILY MEDICINE | Facility: CLINIC | Age: 55
End: 2020-04-10

## 2020-04-10 VITALS
DIASTOLIC BLOOD PRESSURE: 72 MMHG | OXYGEN SATURATION: 98 % | HEIGHT: 70 IN | SYSTOLIC BLOOD PRESSURE: 112 MMHG | RESPIRATION RATE: 14 BRPM | WEIGHT: 180 LBS | HEART RATE: 108 BPM | BODY MASS INDEX: 25.77 KG/M2

## 2020-04-10 DIAGNOSIS — S82.899A OTHER FRACTURE OF UNSPECIFIED LOWER LEG, INITIAL ENCOUNTER FOR CLOSED FRACTURE: ICD-10-CM

## 2020-04-10 DIAGNOSIS — Z02.89 ENCOUNTER FOR OTHER ADMINISTRATIVE EXAMINATIONS: ICD-10-CM

## 2020-04-10 PROCEDURE — 99213 OFFICE O/P EST LOW 20 MIN: CPT

## 2020-04-10 NOTE — ASSESSMENT
[FreeTextEntry1] : 1. I renewed her alprazolam - severe anxiety that has worsened with present COVID19  issue- Use/precautions/safety of medication all reviewed.  [place]  checked [id number]   RRC done   Safe use stressed.  No evidence of suicidal ideation.  2.  COPD- exceesive cough at HS- I renewed her cough medication which she only uses at HS  ORT done   3.  I completed parking permit form for her\par

## 2020-04-10 NOTE — PHYSICAL EXAM
[No Respiratory Distress] : no respiratory distress  [No Accessory Muscle Use] : no accessory muscle use [Normal] : normal rate, regular rhythm, normal S1 and S2 and no murmur heard [No Carotid Bruits] : no carotid bruits [No Edema] : there was no peripheral edema [Normal Posterior Cervical Nodes] : no posterior cervical lymphadenopathy [Normal Anterior Cervical Nodes] : no anterior cervical lymphadenopathy [Speech Grossly Normal] : speech grossly normal [Memory Grossly Normal] : memory grossly normal [Alert and Oriented x3] : oriented to person, place, and time [Normal Insight/Judgement] : insight and judgment were intact [de-identified] : coarse BS [de-identified] : anxious

## 2020-04-10 NOTE — HISTORY OF PRESENT ILLNESS
[FreeTextEntry1] : patient presents for medication renewal\par  [de-identified] : Here for management of anxiety and  chronic cough that is very bad at HS requiring a strong cough medication.

## 2020-04-10 NOTE — REVIEW OF SYSTEMS
[Cough] : cough [Dyspnea on Exertion] : dyspnea on exertion [Joint Pain] : joint pain [Joint Stiffness] : joint stiffness [Anxiety] : anxiety [Negative] : Heme/Lymph

## 2020-05-11 ENCOUNTER — APPOINTMENT (OUTPATIENT)
Dept: FAMILY MEDICINE | Facility: CLINIC | Age: 55
End: 2020-05-11
Payer: MEDICAID

## 2020-05-11 VITALS
SYSTOLIC BLOOD PRESSURE: 110 MMHG | DIASTOLIC BLOOD PRESSURE: 90 MMHG | RESPIRATION RATE: 14 BRPM | HEART RATE: 126 BPM | OXYGEN SATURATION: 98 % | HEIGHT: 70 IN | WEIGHT: 180 LBS | BODY MASS INDEX: 25.77 KG/M2

## 2020-05-11 PROCEDURE — 36415 COLL VENOUS BLD VENIPUNCTURE: CPT

## 2020-05-11 PROCEDURE — 99213 OFFICE O/P EST LOW 20 MIN: CPT | Mod: 25

## 2020-05-11 NOTE — PHYSICAL EXAM
[Normal] : normal rate, regular rhythm, normal S1 and S2 and no murmur heard [Exp Wheezing] : expiratory wheezing was heard [de-identified] : wearing n95

## 2020-05-11 NOTE — ASSESSMENT
[FreeTextEntry1] :  As per usual protocol the patient was advised in regards to the risks of driving when on medications with side effects of dizziness or drowsiness. Patient has been assessed  for increase risk for respiratory depression. Patient denies suicidal ideations or plan.  Istop checked.\par Labs drawn/ specimens obtained  in office on this date of service  for evaluation of   assessed conditions -    cmp  covid  to be run at [facility].\par

## 2020-05-11 NOTE — HISTORY OF PRESENT ILLNESS
[FreeTextEntry1] : patient presents for medication renewal\par  [de-identified] : 55 yo wf here for follow up for anxiety and for cough\par  \par I am social distancing. I have not been working as much so many people are depressed. I am doing suprisingly well during this time. I don’t like the masks. \par \par she is due fo follow up blood work from January

## 2020-05-13 LAB
ALBUMIN SERPL ELPH-MCNC: 4.2 G/DL
ALP BLD-CCNC: 139 U/L
ALT SERPL-CCNC: 22 U/L
ANION GAP SERPL CALC-SCNC: 14 MMOL/L
AST SERPL-CCNC: 26 U/L
BASOPHILS # BLD AUTO: 0.07 K/UL
BASOPHILS NFR BLD AUTO: 0.7 %
BILIRUB SERPL-MCNC: 0.3 MG/DL
BUN SERPL-MCNC: 16 MG/DL
CALCIUM SERPL-MCNC: 9.5 MG/DL
CHLORIDE SERPL-SCNC: 106 MMOL/L
CHOLEST SERPL-MCNC: 268 MG/DL
CHOLEST/HDLC SERPL: 5.2 RATIO
CO2 SERPL-SCNC: 22 MMOL/L
CREAT SERPL-MCNC: 0.92 MG/DL
EOSINOPHIL # BLD AUTO: 0.45 K/UL
EOSINOPHIL NFR BLD AUTO: 4.3 %
FERRITIN SERPL-MCNC: 71 NG/ML
GLUCOSE SERPL-MCNC: 100 MG/DL
HCT VFR BLD CALC: 41.4 %
HDLC SERPL-MCNC: 52 MG/DL
HGB BLD-MCNC: 13.3 G/DL
IMM GRANULOCYTES NFR BLD AUTO: 0.4 %
IRON SATN MFR SERPL: 20 %
IRON SERPL-MCNC: 64 UG/DL
LDLC SERPL CALC-MCNC: 204 MG/DL
LYMPHOCYTES # BLD AUTO: 3.83 K/UL
LYMPHOCYTES NFR BLD AUTO: 36.8 %
MAN DIFF?: NORMAL
MCHC RBC-ENTMCNC: 31.4 PG
MCHC RBC-ENTMCNC: 32.1 GM/DL
MCV RBC AUTO: 97.9 FL
MONOCYTES # BLD AUTO: 0.66 K/UL
MONOCYTES NFR BLD AUTO: 6.3 %
NEUTROPHILS # BLD AUTO: 5.37 K/UL
NEUTROPHILS NFR BLD AUTO: 51.5 %
PLATELET # BLD AUTO: 322 K/UL
POTASSIUM SERPL-SCNC: 5 MMOL/L
PROT SERPL-MCNC: 7.3 G/DL
RBC # BLD: 4.23 M/UL
RBC # FLD: 14.1 %
SARS-COV-2 IGG SERPL IA-ACNC: <0.1 INDEX
SARS-COV-2 IGG SERPL QL IA: NEGATIVE
SODIUM SERPL-SCNC: 141 MMOL/L
TIBC SERPL-MCNC: 324 UG/DL
TRIGL SERPL-MCNC: 65 MG/DL
UIBC SERPL-MCNC: 260 UG/DL
WBC # FLD AUTO: 10.42 K/UL

## 2020-05-14 LAB — BACTERIA UR CULT: ABNORMAL

## 2020-06-15 ENCOUNTER — APPOINTMENT (OUTPATIENT)
Dept: FAMILY MEDICINE | Facility: CLINIC | Age: 55
End: 2020-06-15
Payer: MEDICAID

## 2020-06-15 VITALS
RESPIRATION RATE: 14 BRPM | DIASTOLIC BLOOD PRESSURE: 68 MMHG | WEIGHT: 180 LBS | TEMPERATURE: 98.6 F | BODY MASS INDEX: 25.77 KG/M2 | HEART RATE: 92 BPM | OXYGEN SATURATION: 97 % | HEIGHT: 70 IN | SYSTOLIC BLOOD PRESSURE: 110 MMHG

## 2020-06-15 DIAGNOSIS — M25.50 PAIN IN UNSPECIFIED JOINT: ICD-10-CM

## 2020-06-15 DIAGNOSIS — M79.10 MYALGIA, UNSPECIFIED SITE: ICD-10-CM

## 2020-06-15 PROCEDURE — 99213 OFFICE O/P EST LOW 20 MIN: CPT | Mod: 25

## 2020-06-15 PROCEDURE — 36415 COLL VENOUS BLD VENIPUNCTURE: CPT

## 2020-06-15 RX ORDER — NITROFURANTOIN (MONOHYDRATE/MACROCRYSTALS) 25; 75 MG/1; MG/1
100 CAPSULE ORAL
Qty: 20 | Refills: 0 | Status: DISCONTINUED | COMMUNITY
Start: 2020-05-14 | End: 2020-06-15

## 2020-06-15 NOTE — HEALTH RISK ASSESSMENT
[] : Yes [Never (0 pts)] : Never (0 points) [No] : In the past 12 months have you used drugs other than those required for medical reasons? No [Audit-CScore] : 0 [de-identified] : no [de-identified] : no

## 2020-06-15 NOTE — HISTORY OF PRESENT ILLNESS
[FreeTextEntry1] : patient presents for medication renewal\par  [de-identified] : 55 yo wf here for follow up for anxiety and for cough\par  \par I wake up every morning and I feel like I was beat up. I have leg swelling every night. Left hip knees ankles shoulder fingers. I cant open a drink . I wake up every day like this,. I have not  been out. I don’t have tick bites. \par NO chest pain\par + dizzy at times\par + sleep walking I went to the mailbox in my [facility]. \par I have been working every day. \par

## 2020-06-15 NOTE — PHYSICAL EXAM
[Exp Wheezing] : expiratory wheezing was heard [Normal] : normal rate, regular rhythm, normal S1 and S2 and no murmur heard [de-identified] : wearing n95

## 2020-06-15 NOTE — ASSESSMENT
[FreeTextEntry1] :  As per usual protocol the patient was advised in regards to the risks of driving when on medications with side effects of dizziness or drowsiness. Patient has been assessed  for increase risk for respiratory depression. Patient denies suicidal ideations or plan.  Istop checked.\par Labs drawn/ specimens obtained  in office on this date of service  for evaluation of   assessed conditions -    cmp   joint pain work up. to be run at [facility].\par hold zetia for now

## 2020-06-17 LAB
ALBUMIN SERPL ELPH-MCNC: 4.4 G/DL
ALP BLD-CCNC: 141 U/L
ALT SERPL-CCNC: 18 U/L
ANA SER IF-ACNC: NEGATIVE
ANION GAP SERPL CALC-SCNC: 16 MMOL/L
AST SERPL-CCNC: 19 U/L
BASOPHILS # BLD AUTO: 0.08 K/UL
BASOPHILS NFR BLD AUTO: 0.8 %
BILIRUB SERPL-MCNC: 0.2 MG/DL
BUN SERPL-MCNC: 15 MG/DL
C3 SERPL-MCNC: 146 MG/DL
C4 SERPL-MCNC: 35 MG/DL
CALCIUM SERPL-MCNC: 9.2 MG/DL
CHLORIDE SERPL-SCNC: 104 MMOL/L
CK SERPL-CCNC: 39 U/L
CO2 SERPL-SCNC: 22 MMOL/L
CREAT SERPL-MCNC: 0.81 MG/DL
CRP SERPL-MCNC: 0.51 MG/DL
DSDNA AB SER-ACNC: <12 IU/ML
ENA JO1 AB SER IA-ACNC: <0.2 AL
ENA SS-A AB SER IA-ACNC: <0.2 AL
ENA SS-B AB SER IA-ACNC: <0.2 AL
EOSINOPHIL # BLD AUTO: 0.24 K/UL
EOSINOPHIL NFR BLD AUTO: 2.3 %
ERYTHROCYTE [SEDIMENTATION RATE] IN BLOOD BY WESTERGREN METHOD: 36 MM/HR
ESTIMATED AVERAGE GLUCOSE: 108 MG/DL
FOLATE SERPL-MCNC: 3.5 NG/ML
GLUCOSE SERPL-MCNC: 110 MG/DL
HBA1C MFR BLD HPLC: 5.4 %
HCT VFR BLD CALC: 44.2 %
HCV AB SER QL: NONREACTIVE
HCV S/CO RATIO: 0.08 S/CO
HGB BLD-MCNC: 14.1 G/DL
HLA-B27 RELATED AG QL: NORMAL
IMM GRANULOCYTES NFR BLD AUTO: 0.2 %
LYMPHOCYTES # BLD AUTO: 2.68 K/UL
LYMPHOCYTES NFR BLD AUTO: 26.1 %
MAGNESIUM SERPL-MCNC: 2.3 MG/DL
MAN DIFF?: NORMAL
MCHC RBC-ENTMCNC: 31.3 PG
MCHC RBC-ENTMCNC: 31.9 GM/DL
MCV RBC AUTO: 98.2 FL
MONOCYTES # BLD AUTO: 0.65 K/UL
MONOCYTES NFR BLD AUTO: 6.3 %
NEUTROPHILS # BLD AUTO: 6.6 K/UL
NEUTROPHILS NFR BLD AUTO: 64.3 %
PLATELET # BLD AUTO: 322 K/UL
POTASSIUM SERPL-SCNC: 4.3 MMOL/L
PROT SERPL-MCNC: 7 G/DL
RBC # BLD: 4.5 M/UL
RBC # FLD: 14.1 %
RHEUMATOID FACT SER QL: <10 IU/ML
SODIUM SERPL-SCNC: 141 MMOL/L
TSH SERPL-ACNC: 1.98 UIU/ML
URATE SERPL-MCNC: 4.7 MG/DL
VIT B12 SERPL-MCNC: 1026 PG/ML
WBC # FLD AUTO: 10.27 K/UL

## 2020-06-18 LAB — G6PD SER-CCNC: 12.2 U/G HGB

## 2020-07-10 ENCOUNTER — APPOINTMENT (OUTPATIENT)
Dept: FAMILY MEDICINE | Facility: CLINIC | Age: 55
End: 2020-07-10
Payer: MEDICAID

## 2020-07-10 VITALS
DIASTOLIC BLOOD PRESSURE: 88 MMHG | WEIGHT: 180 LBS | OXYGEN SATURATION: 97 % | RESPIRATION RATE: 14 BRPM | TEMPERATURE: 98.2 F | SYSTOLIC BLOOD PRESSURE: 122 MMHG | BODY MASS INDEX: 25.77 KG/M2 | HEART RATE: 85 BPM | HEIGHT: 70 IN

## 2020-07-10 DIAGNOSIS — M79.605 PAIN IN LEFT LEG: ICD-10-CM

## 2020-07-10 PROCEDURE — 99213 OFFICE O/P EST LOW 20 MIN: CPT

## 2020-07-10 NOTE — HISTORY OF PRESENT ILLNESS
[FreeTextEntry1] : patient presents for medication renewal\par  [de-identified] : 55 yo wf here for follow up for anxiety and for cough\par  \par I am thinking I am still sleep walking .I started on the couch and ended up in my bed. No memory of it. I left the ac on in the living room. and I didn’t put the ac on in the bedroom. \par  My legs hurt all day. no swelling.  Nothing makes them feel better. \par  john was n [facility] for ablation for afib and they said it was ok and he could not urinate. They did a napoles and got 4 L . and then there was blood.  He is on eliquis.  they had to keep him overnight and they kept the napoles in and today he goes back to [facility] to have the napoles removed.  I am stressed about this.\par

## 2020-07-10 NOTE — ASSESSMENT
[FreeTextEntry1] :  As per usual protocol the patient was advised in regards to the risks of driving when on medications with side effects of dizziness or drowsiness. Patient has been assessed  for increase risk for respiratory depression. Patient denies suicidal ideations or plan.  Istop checked.\par  check arterial doppler ro pad\par labs reviewed\par resume zetia\par  As per usual protocol the patient was advised in regards to the risks of driving when on medications with side effects of dizziness or drowsiness. Patient has been assessed  for increase risk for respiratory depression. Patient denies suicidal ideations or plan.  Istop checked.\par renew cough medicine. \par \par

## 2020-07-10 NOTE — HEALTH RISK ASSESSMENT
[] : Yes [Never (0 pts)] : Never (0 points) [No] : In the past 12 months have you used drugs other than those required for medical reasons? No [de-identified] : no [de-identified] : no [Audit-CScore] : 0

## 2020-07-10 NOTE — PHYSICAL EXAM
[Exp Wheezing] : expiratory wheezing was heard [Normal] : normal rate, regular rhythm, normal S1 and S2 and no murmur heard [de-identified] : wearing n95

## 2020-08-11 ENCOUNTER — APPOINTMENT (OUTPATIENT)
Dept: FAMILY MEDICINE | Facility: CLINIC | Age: 55
End: 2020-08-11
Payer: MEDICAID

## 2020-08-11 VITALS
BODY MASS INDEX: 25.77 KG/M2 | DIASTOLIC BLOOD PRESSURE: 82 MMHG | RESPIRATION RATE: 14 BRPM | OXYGEN SATURATION: 98 % | TEMPERATURE: 98.3 F | WEIGHT: 180 LBS | SYSTOLIC BLOOD PRESSURE: 120 MMHG | HEART RATE: 101 BPM | HEIGHT: 70 IN

## 2020-08-11 DIAGNOSIS — S49.92XS UNSPECIFIED INJURY OF LEFT SHOULDER AND UPPER ARM, SEQUELA: ICD-10-CM

## 2020-08-11 PROCEDURE — 99213 OFFICE O/P EST LOW 20 MIN: CPT

## 2020-08-11 NOTE — HISTORY OF PRESENT ILLNESS
[FreeTextEntry1] : patient presents for medication renewal\par  [de-identified] : 53 yo wf here for follow up for anxiety \par she is not adjusting to her new home. I fell down the basement stairs.  I have a lump on my left  arm.  I hit my head and got a concussion. \par I am sick of everything  I am sick of " hate " on the tv. I am tired of the politics.  I get disgusted . It s not my world anymore It is depressing . You have the election and the nonsense. \par My kids are in [place]. \par  \par \par I have migraines my neck my legs hurt\par  \par  \par

## 2020-08-11 NOTE — PHYSICAL EXAM
[Exp Wheezing] : expiratory wheezing was heard [Normal] : normal gait, coordination grossly intact, no focal deficits and deep tendon reflexes were 2+ and symmetric [de-identified] : wearing n95 [de-identified] : left arm lump soft firm mobile

## 2020-08-11 NOTE — HEALTH RISK ASSESSMENT
[] : Yes [No] : In the past 12 months have you used drugs other than those required for medical reasons? No [Never (0 pts)] : Never (0 points) [Audit-CScore] : 0 [de-identified] : no [de-identified] : no

## 2020-08-11 NOTE — REVIEW OF SYSTEMS
[Cough] : cough [Fatigue] : fatigue [Anxiety] : anxiety [Negative] : Gastrointestinal [Joint Pain] : joint pain [Muscle Pain] : muscle pain [Headache] : headache [Memory Loss] : memory loss

## 2020-08-11 NOTE — ASSESSMENT
[FreeTextEntry1] :  As per usual protocol the patient was advised in regards to the risks of driving when on medications with side effects of dizziness or drowsiness. Patient has been assessed  for increase risk for respiratory depression. Patient denies suicidal ideations or plan.  Istop checked.\par  \par labs reviewed\par resume zetia\par follow up  neurology for head contusion /concussion \par follow up sonogram left arm \par mammogram sonogram arterial doppler\par  \par renew cough medicine. \par \par

## 2020-09-11 ENCOUNTER — APPOINTMENT (OUTPATIENT)
Dept: FAMILY MEDICINE | Facility: CLINIC | Age: 55
End: 2020-09-11
Payer: MEDICAID

## 2020-09-11 VITALS
HEIGHT: 70 IN | DIASTOLIC BLOOD PRESSURE: 80 MMHG | OXYGEN SATURATION: 98 % | WEIGHT: 180 LBS | BODY MASS INDEX: 25.77 KG/M2 | SYSTOLIC BLOOD PRESSURE: 124 MMHG | RESPIRATION RATE: 14 BRPM | HEART RATE: 86 BPM

## 2020-09-11 VITALS — TEMPERATURE: 98.1 F

## 2020-09-11 DIAGNOSIS — M25.552 PAIN IN LEFT HIP: ICD-10-CM

## 2020-09-11 PROCEDURE — 99213 OFFICE O/P EST LOW 20 MIN: CPT

## 2020-09-11 NOTE — COUNSELING
[Cessation strategies including cessation program discussed] : Cessation strategies including cessation program discussed [Use of nicotine replacement therapies and other medications discussed] : Use of nicotine replacement therapies and other medications discussed [Encouraged to pick a quit date and identify support needed to quit] : Encouraged to pick a quit date and identify support needed to quit

## 2020-09-11 NOTE — ASSESSMENT
[FreeTextEntry1] :  As per usual protocol the patient was advised in regards to the risks of driving when on medications with side effects of dizziness or drowsiness. Patient has been assessed  for increase risk for respiratory depression. Patient denies suicidal ideations or plan.  Istop checked.\par trial lotrisone\par take xray for left hip pain\par mammogram ro neoplasm\par us breast ro neoplasm\par us left arm lump possible calcified hematoma\par arterial doppler ro pad\par

## 2020-09-11 NOTE — REVIEW OF SYSTEMS
[Fatigue] : fatigue [Joint Stiffness] : joint stiffness [Joint Pain] : joint pain [Joint Swelling] : joint swelling [Muscle Pain] : no muscle pain [Back Pain] : no back pain

## 2020-09-11 NOTE — HISTORY OF PRESENT ILLNESS
[FreeTextEntry1] : pt presents for med follow up  [de-identified] : I am still doing the same things. I havent unpacked my apt. I am not happy it doesn’t feel like home. I had a home for 30 y ad trying to get used to this tiny place is hard. My boss is hard too. He wants me to get  a covid swab\par I didn’t do my  sonogram or mammograms\par My left hip hurts worse in the am . ? arthritis\par I have a rash under my breasts

## 2020-09-11 NOTE — HEALTH RISK ASSESSMENT
[] : Yes [No] : In the past 12 months have you used drugs other than those required for medical reasons? No [de-identified] : no [Audit-CScore] : 0 [de-identified] : no

## 2020-09-11 NOTE — PHYSICAL EXAM
[Normal] : no respiratory distress, lungs were clear to auscultation bilaterally and no accessory muscle use [de-identified] : left greater trochanter pain [de-identified] : under breast red tender itchy

## 2020-10-09 ENCOUNTER — NON-APPOINTMENT (OUTPATIENT)
Age: 55
End: 2020-10-09

## 2020-10-09 ENCOUNTER — APPOINTMENT (OUTPATIENT)
Dept: FAMILY MEDICINE | Facility: CLINIC | Age: 55
End: 2020-10-09
Payer: MEDICAID

## 2020-10-09 VITALS
DIASTOLIC BLOOD PRESSURE: 82 MMHG | WEIGHT: 175 LBS | OXYGEN SATURATION: 97 % | SYSTOLIC BLOOD PRESSURE: 120 MMHG | TEMPERATURE: 98.1 F | HEART RATE: 93 BPM | HEIGHT: 70 IN | RESPIRATION RATE: 14 BRPM | BODY MASS INDEX: 25.05 KG/M2

## 2020-10-09 DIAGNOSIS — R51.9 HEADACHE, UNSPECIFIED: ICD-10-CM

## 2020-10-09 DIAGNOSIS — R06.02 SHORTNESS OF BREATH: ICD-10-CM

## 2020-10-09 DIAGNOSIS — Z78.0 ASYMPTOMATIC MENOPAUSAL STATE: ICD-10-CM

## 2020-10-09 PROCEDURE — 93000 ELECTROCARDIOGRAM COMPLETE: CPT

## 2020-10-09 PROCEDURE — 99214 OFFICE O/P EST MOD 30 MIN: CPT | Mod: 25

## 2020-10-09 NOTE — HISTORY OF PRESENT ILLNESS
[FreeTextEntry1] : pt presents for med follow up  [de-identified] : 53 yo wf here for follow up\par co fatigue\par I get home from work walk the dog sometimes make dinner sit on the couch and then I wake up at 2 am. I am so tired. On the weekends I spend most of my time sleeping.If I sit down or lay down I am exhausted.\par + sob during the day. anything i do physical I am shortness of breath and sweating\par headaches . every day I get up in the morning  I havent seen neuro .I have hx of chiari malformation\par no chest pain no abd pain no rectal bleeding no nose bleeds. no hematuria\par \par \par \par

## 2020-10-09 NOTE — HEALTH RISK ASSESSMENT
[] : Yes [No] : In the past 12 months have you used drugs other than those required for medical reasons? No [Audit-CScore] : 0 [de-identified] : no [de-identified] : no

## 2020-10-09 NOTE — REVIEW OF SYSTEMS
[Fatigue] : fatigue [Joint Pain] : joint pain [Joint Stiffness] : joint stiffness [Joint Swelling] : joint swelling [Lower Ext Edema] : lower extremity edema [Shortness Of Breath] : shortness of breath [Wheezing] : wheezing [Cough] : cough [Headache] : headache [Dizziness] : dizziness [Negative] : Cardiovascular [Muscle Pain] : no muscle pain [Back Pain] : no back pain

## 2020-10-09 NOTE — ASSESSMENT
[FreeTextEntry1] :  As per usual protocol the patient was advised in regards to the risks of driving when on medications with side effects of dizziness or drowsiness. Patient has been assessed  for increase risk for respiratory depression. Patient denies suicidal ideations or plan.  Istop checked.\par  patient has new onset shortness of breath and headaches daily trial topamax for headache follow up with neuro  trial albuterol for sob follow up 1 months\par she is stressed out and needs a note for work\par Labs drawn/ specimens obtained  in office on this date of service  for evaluation of   assessed conditions -     fbw and tests for sob and headaches  to be run at [facility].\par mri brain no contrast ro migraine neoplasm worsening chiari\par ct chest with contrast ro nodule/ pe\par go to er if worse shortness of breath or headache\par EKG performed on this day in the office and reviewed by RAMONA Beckford. It is stable.\par \par patient non compliant in doing the other orders for her extremities and hip and mammogram i gave them all back to her\par We spent sufficient time to discuss aspects of care; questions were answered  to patient's satisfaction.The diagnosis and care plan were discussed with patient in detail.  Patient test results were  reviewed and explained in full. All questions and concerns  were answered to the best of my knowledge.\par

## 2020-10-09 NOTE — PHYSICAL EXAM
[Normal Rate] : normal [Rhythm Regular] : regular [Normal S1] : normal S1 [Normal S2] : normal S2 [___ +] : bilateral [unfilled]U+ pitting edema to the ankles [Normal] : the deep tendon reflexes were normal [de-identified] : appears fatigued [de-identified] : left greater trochanter pain [de-identified] : under breast red tender itchy

## 2020-10-15 ENCOUNTER — NON-APPOINTMENT (OUTPATIENT)
Age: 55
End: 2020-10-15

## 2020-10-15 LAB — COMPREHENSIVE SCREEN URINE: NORMAL

## 2020-10-27 DIAGNOSIS — I67.82 CEREBRAL ISCHEMIA: ICD-10-CM

## 2020-11-11 ENCOUNTER — APPOINTMENT (OUTPATIENT)
Dept: FAMILY MEDICINE | Facility: CLINIC | Age: 55
End: 2020-11-11
Payer: MEDICAID

## 2020-11-11 VITALS — TEMPERATURE: 98.3 F

## 2020-11-11 VITALS
WEIGHT: 175 LBS | HEART RATE: 106 BPM | RESPIRATION RATE: 14 BRPM | DIASTOLIC BLOOD PRESSURE: 86 MMHG | BODY MASS INDEX: 25.05 KG/M2 | HEIGHT: 70 IN | OXYGEN SATURATION: 96 % | SYSTOLIC BLOOD PRESSURE: 126 MMHG

## 2020-11-11 PROCEDURE — 99072 ADDL SUPL MATRL&STAF TM PHE: CPT

## 2020-11-11 PROCEDURE — 99213 OFFICE O/P EST LOW 20 MIN: CPT

## 2020-11-11 RX ORDER — TOPIRAMATE 50 MG/1
50 TABLET, FILM COATED ORAL
Qty: 60 | Refills: 3 | Status: DISCONTINUED | COMMUNITY
Start: 2020-10-09 | End: 2020-11-11

## 2020-11-11 RX ORDER — PANTOPRAZOLE 40 MG/1
40 TABLET, DELAYED RELEASE ORAL
Qty: 30 | Refills: 1 | Status: DISCONTINUED | COMMUNITY
Start: 2020-02-10 | End: 2020-11-11

## 2020-11-11 RX ORDER — EZETIMIBE 10 MG/1
10 TABLET ORAL
Qty: 90 | Refills: 3 | Status: ACTIVE | COMMUNITY
Start: 2019-09-12 | End: 1900-01-01

## 2020-11-11 NOTE — HISTORY OF PRESENT ILLNESS
[FreeTextEntry1] : Patient presents for a 1 month follow up for medication  [de-identified] : 55 you wf here for follow up she is upset about the test results- she is seeing her cardio who ordered stress test and echo\par she is working on the diet\par she is on the crestor generic and the zetia. \par she has a lot of anxiety \par I would like to quit smoking but chantix is not covered

## 2020-11-11 NOTE — ASSESSMENT
[FreeTextEntry1] : Basic cardiovascular prevention measures are advised including regular exercise, surveillance medical examination, and prudent portion-controlled low fat diet, rich in a variety of vegetables with minimal added sugars, refined starches, and no artificially hydrogenated oils.\par  As per usual protocol the patient was advised in regards to the risks of driving when on medications with side effects of dizziness or drowsiness. Patient has been assessed  for increase risk for respiratory depression. Patient denies suicidal ideations or plan.  Istop checked.\par

## 2020-12-11 ENCOUNTER — APPOINTMENT (OUTPATIENT)
Dept: FAMILY MEDICINE | Facility: CLINIC | Age: 55
End: 2020-12-11
Payer: MEDICAID

## 2020-12-11 VITALS
RESPIRATION RATE: 14 BRPM | DIASTOLIC BLOOD PRESSURE: 90 MMHG | OXYGEN SATURATION: 98 % | TEMPERATURE: 98.6 F | HEART RATE: 101 BPM | SYSTOLIC BLOOD PRESSURE: 140 MMHG | WEIGHT: 175 LBS | BODY MASS INDEX: 25.05 KG/M2 | HEIGHT: 70 IN

## 2020-12-11 PROCEDURE — 99214 OFFICE O/P EST MOD 30 MIN: CPT

## 2020-12-11 PROCEDURE — 99072 ADDL SUPL MATRL&STAF TM PHE: CPT

## 2020-12-11 RX ORDER — HYDROCODONE BITARTRATE AND HOMATROPINE METHYLBROMIDE 5; 1.5 MG/5ML; MG/5ML
5-1.5 SYRUP ORAL EVERY 4 HOURS
Qty: 100 | Refills: 0 | Status: DISCONTINUED | COMMUNITY
Start: 2017-11-06 | End: 2020-12-11

## 2020-12-11 RX ORDER — NALOXONE HYDROCHLORIDE NASAL 4 MG/.1ML
4 SPRAY NASAL
Qty: 1 | Refills: 5 | Status: ACTIVE | COMMUNITY
Start: 2018-05-24 | End: 1900-01-01

## 2020-12-11 NOTE — PLAN
[FreeTextEntry1] : \par anxiety\par advised benzodiazepines are not the best medications for anxiety and she is on a high dose of xanax\par she is willing to try citalopram which she hasn’t tried before, denies anaphylaxis to other ssris\par she is willing to try the medication and see if it helps her anxiety\par she is taking xanax 2mg bid and sometimes 1mg at night \par istopped Reference #: [id number]\par \par chronic cough, empysema\par advised to use albuterol which she hasn’t been using\par advised  it is not best to take hycodan nightly for months at a time\par trial of flonase/azelastine as she might have a postnasal drip component as well \par advised pulm consult for pfts to see if emphysema is worsening\par ct chest recently 10/2020 was wnl\par she wants to quit smoking, smoking cessation [facility] team to call her\par will prescribe chantix at her request\par \par \par refilled folic acid and narcan\par f/u 1month or sooner if worsening pt agreed w/plan\par

## 2020-12-11 NOTE — REVIEW OF SYSTEMS
[Shortness Of Breath] : shortness of breath [Cough] : cough [Fever] : no fever [Chills] : no chills [Chest Pain] : no chest pain

## 2020-12-11 NOTE — HISTORY OF PRESENT ILLNESS
[FreeTextEntry1] : patient presents for medication renewal\par  [de-identified] : patient presents for medication renewal\par \par she said she saw a pulmonologist upstairs in the past she doesn’t remember but she said maybe 2 yrs ago\par she is a smoker\par she said she has a cough worse at night\par she said it is not every night\par she is taking hycodan to sleep\par she said she takes a 1/2 teaspoon at night only but has been taking it for many months due to a nightly cough \par \par she is taking xanax for anxiety and she takes it in the morning and sometimes in the afternoon, she said sometimes she takes 1/2 tab at night if she isnt coughing\par denies coughing up blood\par she has sob at times\par \par

## 2020-12-21 PROBLEM — Z87.09 HISTORY OF ACUTE SINUSITIS: Status: RESOLVED | Noted: 2019-12-11 | Resolved: 2020-12-21

## 2021-01-15 ENCOUNTER — APPOINTMENT (OUTPATIENT)
Dept: FAMILY MEDICINE | Facility: CLINIC | Age: 56
End: 2021-01-15
Payer: MEDICAID

## 2021-01-15 ENCOUNTER — APPOINTMENT (OUTPATIENT)
Dept: FAMILY MEDICINE | Facility: CLINIC | Age: 56
End: 2021-01-15

## 2021-01-15 VITALS
BODY MASS INDEX: 25.77 KG/M2 | SYSTOLIC BLOOD PRESSURE: 120 MMHG | HEIGHT: 70 IN | RESPIRATION RATE: 14 BRPM | DIASTOLIC BLOOD PRESSURE: 80 MMHG | HEART RATE: 108 BPM | OXYGEN SATURATION: 96 % | TEMPERATURE: 98.1 F | WEIGHT: 180 LBS

## 2021-01-15 DIAGNOSIS — I73.9 PERIPHERAL VASCULAR DISEASE, UNSPECIFIED: ICD-10-CM

## 2021-01-15 LAB
CHOLEST SERPL-MCNC: 252 MG/DL
CHOLEST/HDLC SERPL: 5.1 RATIO
HDLC SERPL-MCNC: 50 MG/DL
LDLC SERPL CALC-MCNC: 187 MG/DL
TRIGL SERPL-MCNC: 77 MG/DL

## 2021-01-15 PROCEDURE — 99072 ADDL SUPL MATRL&STAF TM PHE: CPT

## 2021-01-15 PROCEDURE — 99214 OFFICE O/P EST MOD 30 MIN: CPT

## 2021-01-15 RX ORDER — ASPIRIN ENTERIC COATED TABLETS 81 MG 81 MG/1
81 TABLET, DELAYED RELEASE ORAL
Refills: 0 | Status: ACTIVE | COMMUNITY

## 2021-01-15 RX ORDER — CITALOPRAM HYDROBROMIDE 10 MG/1
10 TABLET, FILM COATED ORAL DAILY
Qty: 30 | Refills: 1 | Status: DISCONTINUED | COMMUNITY
Start: 2020-12-11 | End: 2021-01-15

## 2021-01-15 NOTE — ASSESSMENT
[FreeTextEntry1] : refer to vascular\par refer to cardiology\par Basic cardiovascular prevention measures are advised including regular exercise, surveillance medical examination, and prudent portion-controlled low fat diet, rich in a variety of vegetables with minimal added sugars, refined starches, and no artificially hydrogenated oils.\par Discussion and interpretation of previous tests , external notes( labs, radiology, specialist  , patient verbalized understanding.\par Prescription drug management\par renew xanax, discussed weaning\par  As per usual protocol the patient was advised in regards to the risks of driving when on medications with side effects of dizziness or drowsiness. Patient has been assessed  for increase risk for respiratory depression. Patient denies suicidal ideations or plan.  Istop checked.\par \par renew cough syrup discussed side effects\par renew chantix . she must quit tobacco\par

## 2021-01-15 NOTE — HISTORY OF PRESENT ILLNESS
[de-identified] : 56 yo wf here for follow up. \par she saw neurology. SHe has several tests- eeg nl . \par thoracic spine - mild degenerative disease\par cervical spine herniated disc c4 c5 c6\par \par mri brain- small vessel disease\par mra normal\par carotid smooth plaque\par emg normal\par \par I havent called vascular or spine specialist\par \par I lost my job\par  \par My ex got covid \par  [FreeTextEntry1] : Patient present for medication renewal\par

## 2021-02-20 ENCOUNTER — APPOINTMENT (OUTPATIENT)
Dept: FAMILY MEDICINE | Facility: CLINIC | Age: 56
End: 2021-02-20
Payer: MEDICAID

## 2021-02-20 VITALS
TEMPERATURE: 97.7 F | OXYGEN SATURATION: 97 % | DIASTOLIC BLOOD PRESSURE: 80 MMHG | BODY MASS INDEX: 25.77 KG/M2 | HEIGHT: 70 IN | HEART RATE: 77 BPM | RESPIRATION RATE: 12 BRPM | SYSTOLIC BLOOD PRESSURE: 120 MMHG | WEIGHT: 180 LBS

## 2021-02-20 PROCEDURE — 99213 OFFICE O/P EST LOW 20 MIN: CPT

## 2021-02-20 PROCEDURE — 99072 ADDL SUPL MATRL&STAF TM PHE: CPT

## 2021-02-20 NOTE — HISTORY OF PRESENT ILLNESS
[FreeTextEntry1] : Patient present for medication renewal\par  [de-identified] : 56 yo wf here for follow up. \par she saw neurology. SHe has several tests- eeg nl . \par thoracic spine - mild degenerative disease\par cervical spine herniated disc c4 c5 c6\par \par mri brain- small vessel disease\par mra normal\par carotid smooth plaque\par emg normal\par \par I   called vascular or spine specialist but hey don’t take medicaid or if they do then they don’t take new patients\par I cant find a psychiatrist. \par \par \par  \par

## 2021-02-20 NOTE — ASSESSMENT
[FreeTextEntry1] : refer to vascular\par refer to ortho spine [facility] care\par Basic cardiovascular prevention measures are advised including regular exercise, surveillance medical examination, and prudent portion-controlled low fat diet, rich in a variety of vegetables with minimal added sugars, refined starches, and no artificially hydrogenated oils.\par Discussion and interpretation of previous tests , external notes( labs, radiology, specialist  , patient verbalized understanding.\par Prescription drug management\par renew xanax, discussed weaning\par  As per usual protocol the patient was advised in regards to the risks of driving when on medications with side effects of dizziness or drowsiness. Patient has been assessed  for increase risk for respiratory depression. Patient denies suicidal ideations or plan.  Istop checked.\par cont chantix for smoking she fought [employer] and won.\par  \par

## 2021-02-22 LAB — BACTERIA UR CULT: ABNORMAL

## 2021-02-24 LAB — COMPREHENSIVE SCREEN URINE: NORMAL

## 2021-03-18 ENCOUNTER — APPOINTMENT (OUTPATIENT)
Dept: FAMILY MEDICINE | Facility: CLINIC | Age: 56
End: 2021-03-18
Payer: MEDICAID

## 2021-03-18 VITALS
DIASTOLIC BLOOD PRESSURE: 70 MMHG | SYSTOLIC BLOOD PRESSURE: 118 MMHG | HEIGHT: 70 IN | HEART RATE: 100 BPM | BODY MASS INDEX: 25.77 KG/M2 | RESPIRATION RATE: 14 BRPM | OXYGEN SATURATION: 98 % | WEIGHT: 180 LBS | TEMPERATURE: 98 F

## 2021-03-18 DIAGNOSIS — J43.9 EMPHYSEMA, UNSPECIFIED: ICD-10-CM

## 2021-03-18 PROCEDURE — 99072 ADDL SUPL MATRL&STAF TM PHE: CPT

## 2021-03-18 PROCEDURE — 99213 OFFICE O/P EST LOW 20 MIN: CPT

## 2021-03-18 NOTE — ASSESSMENT
[FreeTextEntry1] :  \par Basic cardiovascular prevention measures are advised including regular exercise, surveillance medical examination, and prudent portion-controlled low fat diet, rich in a variety of vegetables with minimal added sugars, refined starches, and no artificially hydrogenated oils.\par Discussion and interpretation of previous tests , external notes( labs, radiology, specialist  , patient verbalized understanding.\par Prescription drug management\par renew xanax, discussed weaning\par  As per usual protocol the patient was advised in regards to the risks of driving when on medications with side effects of dizziness or drowsiness. Patient has been assessed  for increase risk for respiratory depression. Patient denies suicidal ideations or plan.  Istop checked.\par  stop chantix if she feels mood depression\par  \par

## 2021-03-18 NOTE — CURRENT MEDS
[Takes medication as prescribed] : takes [None] : Patient does not have any barriers to medication adherence [Yes] : Reviewed medication list for presence of high-risk medications. [Benzodiazepines] : benzodiazepines [FreeTextEntry1] : pt takes half rosuvastatin

## 2021-03-18 NOTE — HISTORY OF PRESENT ILLNESS
[FreeTextEntry1] : Patient present for medication renewal\par  [de-identified] : 56 yo wf here for follow up. \par Previously:\par she saw neurology. SHe has several tests- eeg nl . \par thoracic spine - mild degenerative disease\par cervical spine herniated disc c4 c5 c6\par \par mri brain- small vessel disease\par mra normal\par carotid smooth plaque\par emg normal\par \par  she has cut back her smoking to 5 -6 cig per day from 2 ppd. \par Chantix is making me a little nausea and zepeda. \par I am going to have a nervus break down. I am crying out of nowhere. It is just me and the dog. . I am alone. I have no money. I don’t answer the phone. I think the chantix is part of the problem. \par \par \par  \par

## 2021-04-15 ENCOUNTER — APPOINTMENT (OUTPATIENT)
Dept: FAMILY MEDICINE | Facility: CLINIC | Age: 56
End: 2021-04-15
Payer: MEDICAID

## 2021-04-15 VITALS
RESPIRATION RATE: 14 BRPM | BODY MASS INDEX: 25.77 KG/M2 | HEART RATE: 95 BPM | SYSTOLIC BLOOD PRESSURE: 120 MMHG | DIASTOLIC BLOOD PRESSURE: 70 MMHG | OXYGEN SATURATION: 98 % | HEIGHT: 70 IN | WEIGHT: 180 LBS

## 2021-04-15 VITALS — TEMPERATURE: 97.3 F

## 2021-04-15 DIAGNOSIS — K21.9 GASTRO-ESOPHAGEAL REFLUX DISEASE W/OUT ESOPHAGITIS: ICD-10-CM

## 2021-04-15 PROCEDURE — 99213 OFFICE O/P EST LOW 20 MIN: CPT

## 2021-04-15 PROCEDURE — 99072 ADDL SUPL MATRL&STAF TM PHE: CPT

## 2021-04-15 RX ORDER — OMEPRAZOLE 20 MG/1
20 CAPSULE, DELAYED RELEASE ORAL
Qty: 30 | Refills: 0 | Status: ACTIVE | COMMUNITY
Start: 2021-04-15 | End: 1900-01-01

## 2021-04-15 NOTE — ASSESSMENT
[FreeTextEntry1] :  \par Basic cardiovascular prevention measures are advised including regular exercise, surveillance medical examination, and prudent portion-controlled low fat diet, rich in a variety of vegetables with minimal added sugars, refined starches, and no artificially hydrogenated oils.\par Discussion and interpretation of previous tests , external notes( labs, radiology, specialist  , patient verbalized understanding.\par Prescription drug management\par renew xanax, discussed weaning\par  As per usual protocol the patient was advised in regards to the risks of driving when on medications with side effects of dizziness or drowsiness. Patient has been assessed  for increase risk for respiratory depression. Patient denies suicidal ideations or plan.  Istop checked.\par \par

## 2021-04-15 NOTE — HISTORY OF PRESENT ILLNESS
[FreeTextEntry1] : Patient present for medication renewal\par  [de-identified] : 54 yo wf here for follow up. \par Previously:\par she saw neurology. SHe has several tests- eeg nl . \par thoracic spine - mild degenerative disease\par cervical spine herniated disc c4 c5 c6\par \par mri brain- small vessel disease\par mra normal\par carotid smooth plaque\par emg normal\par \par  she has cut back her smoking to 5  cig per day from 2 ppd. \par Chantix is making me a little nausea and zepeda. - I break the blue ones in half. \par \par I am depressed. I have no job.  I applied to everything. Thing are financially difficult. I cant sleep at night. I sleep during the day. I want to take the  test. \par \par I did not see the vascular dr. \par

## 2021-05-14 ENCOUNTER — NON-APPOINTMENT (OUTPATIENT)
Age: 56
End: 2021-05-14

## 2021-05-14 ENCOUNTER — APPOINTMENT (OUTPATIENT)
Dept: FAMILY MEDICINE | Facility: CLINIC | Age: 56
End: 2021-05-14
Payer: MEDICAID

## 2021-05-14 VITALS
TEMPERATURE: 98.2 F | DIASTOLIC BLOOD PRESSURE: 80 MMHG | RESPIRATION RATE: 14 BRPM | HEART RATE: 106 BPM | SYSTOLIC BLOOD PRESSURE: 102 MMHG | BODY MASS INDEX: 25.77 KG/M2 | OXYGEN SATURATION: 97 % | WEIGHT: 180 LBS | HEIGHT: 70 IN

## 2021-05-14 PROCEDURE — 99213 OFFICE O/P EST LOW 20 MIN: CPT

## 2021-05-14 PROCEDURE — 99072 ADDL SUPL MATRL&STAF TM PHE: CPT

## 2021-05-14 RX ORDER — NITROFURANTOIN MACROCRYSTALS 100 MG/1
100 CAPSULE ORAL
Qty: 20 | Refills: 0 | Status: COMPLETED | COMMUNITY
Start: 2021-02-20 | End: 2021-05-14

## 2021-05-14 NOTE — HISTORY OF PRESENT ILLNESS
[FreeTextEntry1] : Patient present for medication renewal\par  [de-identified] : 54 yo wf here for follow up. \par My kids are sending me to [facility] - \par \par \par Previously:\par she saw neurology. SHe has several tests- eeg nl . \par thoracic spine - mild degenerative disease\par cervical spine herniated disc c4 c5 c6\par \par mri brain- small vessel disease\par mra normal\par carotid smooth plaque\par emg normal\par \par  she has cut back her smoking to 5  cig per day from 2 ppd. \par Chantix is making me a little nausea and zepeda. - I break the blue ones in half. \par \par I am depressed. I have no job.  I applied to everything. Thing are financially difficult. I cant sleep at night. I sleep during the day. I want to take the  test. \par \par I did not see the vascular dr. \par I didn’t get the vaccine. I am on the fence.

## 2021-05-14 NOTE — COUNSELING
[Risk of tobacco use and health benefits of smoking cessation discussed] : Risk of tobacco use and health benefits of smoking cessation discussed [Encouraged to pick a quit date and identify support needed to quit] : Encouraged to pick a quit date and identify support needed to quit [Financial issues] : Financial issues [Other: ____] : [unfilled] [Good understanding] : Patient has a good understanding of lifestyle changes and steps needed to achieve self management goal

## 2021-06-14 ENCOUNTER — APPOINTMENT (OUTPATIENT)
Dept: FAMILY MEDICINE | Facility: CLINIC | Age: 56
End: 2021-06-14
Payer: MEDICAID

## 2021-06-14 VITALS
OXYGEN SATURATION: 98 % | SYSTOLIC BLOOD PRESSURE: 108 MMHG | HEIGHT: 70 IN | HEART RATE: 100 BPM | WEIGHT: 180 LBS | TEMPERATURE: 98 F | BODY MASS INDEX: 25.77 KG/M2 | DIASTOLIC BLOOD PRESSURE: 68 MMHG | RESPIRATION RATE: 14 BRPM

## 2021-06-14 PROCEDURE — 99213 OFFICE O/P EST LOW 20 MIN: CPT | Mod: 25

## 2021-06-14 NOTE — COUNSELING
[Risk of tobacco use and health benefits of smoking cessation discussed] : Risk of tobacco use and health benefits of smoking cessation discussed [Encouraged to pick a quit date and identify support needed to quit] : Encouraged to pick a quit date and identify support needed to quit [Other: ____] : [unfilled] [Financial issues] : Financial issues [Good understanding] : Patient has a good understanding of lifestyle changes and steps needed to achieve self management goal

## 2021-06-14 NOTE — HISTORY OF PRESENT ILLNESS
[FreeTextEntry1] : Patient present for medication renewal\par  [de-identified] : 56 yo wf here for follow up. this has been a terrible month\par My ex and his girlfriend broke up. He came to me to fix things\par It has been  a terrible month. My nephew  of OD. He was only 27. Its horrible. I felt very uncomfortable around everyone bc we were .\par \par My dog was sick but she is ok. it was the heat. \par \par My kids got me a trip to    [place] - for Mother's day\par \par \par Previously:\par she saw neurology. SHe has several tests- eeg nl . \par thoracic spine - mild degenerative disease\par cervical spine herniated disc c4 c5 c6\par \par mri brain- small vessel disease\par mra normal\par carotid smooth plaque\par emg normal\par \par  she has cut back her smoking to 5  cig per day from 2 ppd. \par Chantix is making me a little nausea and zepeda. - I break the blue ones in half. \par \par I am depressed. I have no job.  I applied to everything. Thing are financially difficult. I cant sleep at night. I sleep during the day. I want to take the  test. \par \par I did not see the vascular dr. \par I didn’t get the vaccine.

## 2021-06-14 NOTE — ASSESSMENT
[FreeTextEntry1] :  \par Basic cardiovascular prevention measures are advised including regular exercise, surveillance medical examination, and prudent portion-controlled low fat diet, rich in a variety of vegetables with minimal added sugars, refined starches, and no artificially hydrogenated oils.\par Discussion and interpretation of previous tests , external notes( labs, radiology, specialist  , patient verbalized understanding.\par Prescription drug management\par renew xanax,  she requested to be increased I will do it this month and then go back down.\par  As per usual protocol the patient was advised in regards to the risks of driving when on medications with side effects of dizziness or drowsiness. Patient has been assessed  for increase risk for respiratory depression. Patient denies suicidal ideations or plan.  Istop checked.\par Labs drawn/ specimens obtained  in office on this date of service  for evaluation of   assessed conditions -     cbc cmp lipid  to be run at [facility].\par \par

## 2021-06-15 LAB
ALBUMIN SERPL ELPH-MCNC: 4.4 G/DL
ALP BLD-CCNC: 134 U/L
ALT SERPL-CCNC: 12 U/L
ANION GAP SERPL CALC-SCNC: 13 MMOL/L
AST SERPL-CCNC: 22 U/L
BILIRUB SERPL-MCNC: 0.7 MG/DL
BUN SERPL-MCNC: 13 MG/DL
CALCIUM SERPL-MCNC: 9.6 MG/DL
CHLORIDE SERPL-SCNC: 104 MMOL/L
CHOLEST SERPL-MCNC: 293 MG/DL
CO2 SERPL-SCNC: 21 MMOL/L
CREAT SERPL-MCNC: 0.79 MG/DL
ESTIMATED AVERAGE GLUCOSE: 100 MG/DL
GLUCOSE SERPL-MCNC: 95 MG/DL
HBA1C MFR BLD HPLC: 5.1 %
HDLC SERPL-MCNC: 41 MG/DL
LDLC SERPL CALC-MCNC: 221 MG/DL
NONHDLC SERPL-MCNC: 252 MG/DL
POTASSIUM SERPL-SCNC: 3.8 MMOL/L
PROT SERPL-MCNC: 7 G/DL
SODIUM SERPL-SCNC: 138 MMOL/L
TRIGL SERPL-MCNC: 152 MG/DL
TSH SERPL-ACNC: 2 UIU/ML

## 2021-07-09 ENCOUNTER — APPOINTMENT (OUTPATIENT)
Dept: FAMILY MEDICINE | Facility: CLINIC | Age: 56
End: 2021-07-09
Payer: MEDICAID

## 2021-07-09 VITALS
BODY MASS INDEX: 25.77 KG/M2 | HEART RATE: 103 BPM | RESPIRATION RATE: 14 BRPM | HEIGHT: 70 IN | DIASTOLIC BLOOD PRESSURE: 80 MMHG | SYSTOLIC BLOOD PRESSURE: 112 MMHG | WEIGHT: 180 LBS | OXYGEN SATURATION: 98 %

## 2021-07-09 VITALS — TEMPERATURE: 98.8 F

## 2021-07-09 PROCEDURE — 99213 OFFICE O/P EST LOW 20 MIN: CPT

## 2021-07-09 NOTE — ASSESSMENT
[FreeTextEntry1] :  \par Basic cardiovascular prevention measures are advised including regular exercise, surveillance medical examination, and prudent portion-controlled low fat diet, rich in a variety of vegetables with minimal added sugars, refined starches, and no artificially hydrogenated oils.\par Discussion and interpretation of previous tests , external notes( labs, radiology, specialist  , patient verbalized understanding.\par Prescription drug management\par renew xanax- ,  she agreed to wean\par  As per usual protocol the patient was advised in regards to the risks of driving when on medications with side effects of dizziness or drowsiness. Patient has been assessed  for increase risk for respiratory depression. Patient denies suicidal ideations or plan.  Istop checked.\par  lipids next month \par  Tips for anxiety and depression:  Start deep-breathing. If you're not focused on how to calm your body through slow, intentional belly breathing ….Meditate. Calm is an inside job. Give yourself the gift of serenity and start the day with 10 minutes of meditation. Practice self-care. . Get rid of clutter, Plan a day trip, go to bed early, reduce caffeine and sugar. Challenge negative core beliefs, Practice gratitude, attend a social gathering, schedule a visit with a therapist, and exercise.\par Follow up psychiatry

## 2021-07-09 NOTE — HISTORY OF PRESENT ILLNESS
[FreeTextEntry1] : Patient present for medication renewal\par  [de-identified] : 56 yo wf here for follow up  \par I don’t want to go out or do anything I am tired. I am just existing I know I have to do stuff. I am home. I was prescribed xanax  ages ago. It helps my panic attacks. I did not get a psychiatrist yet. It is difficult w insurance\par  My dog  has separation anxiety\par \par  I did not go to cardio I am trying  a diet. I would like to check chol next month\par \par \par Previously:\par she saw neurology. SHe has several tests- eeg nl . \par thoracic spine - mild degenerative disease\par cervical spine herniated disc c4 c5 c6\par \par mri brain- small vessel disease\par mra normal\par carotid smooth plaque\par emg normal\par \par  she has cut back her smoking to 5  cig per day from 2 ppd. \par Chantix is making me a little nausea and zepeda. - I break the blue ones in half. \par \par I am depressed. I have no job.  I applied to everything. Thing are financially difficult. I cant sleep at night. I sleep during the day. I want to take the  test. \par \par I did not see the vascular dr. \par I didn’t get the vaccine.

## 2021-07-09 NOTE — HEALTH RISK ASSESSMENT
[I have developed a follow-up plan documented below in the note.] : I have developed a follow-up plan documented below in the note.

## 2021-08-09 ENCOUNTER — APPOINTMENT (OUTPATIENT)
Dept: FAMILY MEDICINE | Facility: CLINIC | Age: 56
End: 2021-08-09
Payer: MEDICAID

## 2021-08-09 VITALS
HEART RATE: 97 BPM | RESPIRATION RATE: 14 BRPM | OXYGEN SATURATION: 98 % | HEIGHT: 70 IN | WEIGHT: 180 LBS | DIASTOLIC BLOOD PRESSURE: 80 MMHG | BODY MASS INDEX: 25.77 KG/M2 | TEMPERATURE: 98.4 F | SYSTOLIC BLOOD PRESSURE: 110 MMHG

## 2021-08-09 PROCEDURE — 99213 OFFICE O/P EST LOW 20 MIN: CPT

## 2021-08-09 NOTE — HISTORY OF PRESENT ILLNESS
[FreeTextEntry1] : Patient present for medication renewal\par  [de-identified] : 54 yo wf here for follow up  I might move to [place]. My oldest son in [place]. My ex got remarried , My other son lives with him but he will eventually move to [place]. My other son has a possibly to move in [place]. They want her to go there . She cant afford to stay here. I will stay with my son until I can get a place and get a job.  I have a special lopez with my son . He thinks I would love it. \par I don’t want to go out or do anything I am tired. I am just existing I know I have to do stuff. I am home. I was prescribed xanax  ages ago. It helps my panic attacks. I did not get a psychiatrist yet. It is difficult w insurance\par  My dog  has separation anxiety\par \par  I did not go to cardio I am trying  a diet. I would like to check chol next month\par there was a recall on chantix\par \par Previously:\par she saw neurology. SHe has several tests- eeg nl . \par thoracic spine - mild degenerative disease\par cervical spine herniated disc c4 c5 c6\par \par mri brain- small vessel disease\par mra normal\par carotid smooth plaque\par emg normal\par \par  she has cut back her smoking to 5  cig per day from 2 ppd. \par Chantix is making me a little nausea and zepeda. - I break the blue ones in half. \par \par I am depressed. I have no job.  I applied to everything. Thing are financially difficult. I cant sleep at night. I sleep during the day. I want to take the  test. \par \par I did not see the vascular dr. \par I didn’t get the vaccine.

## 2021-08-09 NOTE — ASSESSMENT
[FreeTextEntry1] :  \par Basic cardiovascular prevention measures are advised including regular exercise, surveillance medical examination, and prudent portion-controlled low fat diet, rich in a variety of vegetables with minimal added sugars, refined starches, and no artificially hydrogenated oils.\par Discussion and interpretation of previous tests , external notes( labs, radiology, specialist  , patient verbalized understanding.\par Prescription drug management\par renew xanax- ,  she needs to increase  this month\par  As per usual protocol the patient was advised in regards to the risks of driving when on medications with side effects of dizziness or drowsiness. Patient has been assessed  for increase risk for respiratory depression. Patient denies suicidal ideations or plan.  Istop checked.\par  lipids next month \par  Tips for anxiety and depression:  Start deep-breathing. If you're not focused on how to calm your body through slow, intentional belly breathing ….Meditate. Calm is an inside job. Give yourself the gift of serenity and start the day with 10 minutes of meditation. Practice self-care. . Get rid of clutter, Plan a day trip, go to bed early, reduce caffeine and sugar. Challenge negative core beliefs, Practice gratitude, attend a social gathering, schedule a visit with a therapist, and exercise.\par Follow up psychiatry

## 2021-08-09 NOTE — PHYSICAL EXAM
[Normal] : normal rate, regular rhythm, normal S1 and S2 and no murmur heard [Wet Cough] : a wet cough was heard

## 2021-09-09 ENCOUNTER — APPOINTMENT (OUTPATIENT)
Dept: FAMILY MEDICINE | Facility: CLINIC | Age: 56
End: 2021-09-09
Payer: MEDICAID

## 2021-09-09 VITALS
WEIGHT: 180 LBS | OXYGEN SATURATION: 97 % | BODY MASS INDEX: 25.77 KG/M2 | RESPIRATION RATE: 14 BRPM | HEIGHT: 70 IN | DIASTOLIC BLOOD PRESSURE: 80 MMHG | SYSTOLIC BLOOD PRESSURE: 118 MMHG | HEART RATE: 100 BPM

## 2021-09-09 VITALS — TEMPERATURE: 97.5 F

## 2021-09-09 DIAGNOSIS — E78.5 HYPERLIPIDEMIA, UNSPECIFIED: ICD-10-CM

## 2021-09-09 PROCEDURE — 99213 OFFICE O/P EST LOW 20 MIN: CPT

## 2021-09-09 NOTE — ASSESSMENT
[FreeTextEntry1] :  \par Basic cardiovascular prevention measures are advised including regular exercise, surveillance medical examination, and prudent portion-controlled low fat diet, rich in a variety of vegetables with minimal added sugars, refined starches, and no artificially hydrogenated oils.\par Discussion and interpretation of previous tests , external notes( labs, radiology, specialist  , patient verbalized understanding.\par Prescription drug management\par renew xanax- ,   wean quantity Renew cough syrup\par  As per usual protocol the patient was advised in regards to the risks of driving when on medications with side effects of dizziness or drowsiness. Patient has been assessed  for increase risk for respiratory depression. Patient denies suicidal ideations or plan.  Istop checked.\par Labs drawn/ specimens obtained  in office on this date of service  for evaluation of   assessed conditions -   lipids   to be run at [facility].\par pcr covid since patient had a fever 2 weeks ago\par \par  Tips for anxiety and depression:  Start deep-breathing. If you're not focused on how to calm your body through slow, intentional belly breathing ….Meditate. Calm is an inside job. Give yourself the gift of serenity and start the day with 10 minutes of meditation. Practice self-care. . Get rid of clutter, Plan a day trip, go to bed early, reduce caffeine and sugar. Challenge negative core beliefs, Practice gratitude, attend a social gathering, schedule a visit with a therapist, and exercise.\par Follow up psychiatry

## 2021-09-09 NOTE — PHYSICAL EXAM
[Wet Cough] : a wet cough was heard [Normal] : normal rate, regular rhythm, normal S1 and S2 and no murmur heard
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
## 2021-10-05 ENCOUNTER — APPOINTMENT (OUTPATIENT)
Dept: FAMILY MEDICINE | Facility: CLINIC | Age: 56
End: 2021-10-05
Payer: MEDICAID

## 2021-10-05 VITALS
HEART RATE: 98 BPM | HEIGHT: 70 IN | SYSTOLIC BLOOD PRESSURE: 120 MMHG | WEIGHT: 180 LBS | OXYGEN SATURATION: 98 % | DIASTOLIC BLOOD PRESSURE: 80 MMHG | RESPIRATION RATE: 14 BRPM | BODY MASS INDEX: 25.77 KG/M2

## 2021-10-05 VITALS — TEMPERATURE: 98.2 F

## 2021-10-05 DIAGNOSIS — Z86.19 PERSONAL HISTORY OF OTHER INFECTIOUS AND PARASITIC DISEASES: ICD-10-CM

## 2021-10-05 DIAGNOSIS — Z23 ENCOUNTER FOR IMMUNIZATION: ICD-10-CM

## 2021-10-05 DIAGNOSIS — Z09 ENCOUNTER FOR FOLLOW-UP EXAMINATION AFTER COMPLETED TREATMENT FOR CONDITIONS OTHER THAN MALIGNANT NEOPLASM: ICD-10-CM

## 2021-10-05 DIAGNOSIS — Z87.898 PERSONAL HISTORY OF OTHER SPECIFIED CONDITIONS: ICD-10-CM

## 2021-10-05 DIAGNOSIS — H43.392 OTHER VITREOUS OPACITIES, LEFT EYE: ICD-10-CM

## 2021-10-05 PROCEDURE — 90686 IIV4 VACC NO PRSV 0.5 ML IM: CPT

## 2021-10-05 PROCEDURE — 99213 OFFICE O/P EST LOW 20 MIN: CPT | Mod: 25

## 2021-10-05 PROCEDURE — G0008: CPT

## 2021-10-05 RX ORDER — ROSUVASTATIN CALCIUM 40 MG/1
40 TABLET, FILM COATED ORAL
Qty: 30 | Refills: 6 | Status: ACTIVE | COMMUNITY
Start: 2020-10-30 | End: 1900-01-01

## 2021-10-05 RX ORDER — FOLIC ACID 1 MG/1
1 TABLET ORAL DAILY
Qty: 90 | Refills: 3 | Status: ACTIVE | COMMUNITY
Start: 2017-03-20 | End: 1900-01-01

## 2021-10-05 RX ORDER — HYDROXYZINE HYDROCHLORIDE 50 MG/1
50 TABLET ORAL
Qty: 90 | Refills: 0 | Status: COMPLETED | COMMUNITY
Start: 2018-11-21 | End: 2021-10-05

## 2021-10-05 RX ORDER — FLUTICASONE PROPIONATE 50 UG/1
50 SPRAY, METERED NASAL TWICE DAILY
Qty: 1 | Refills: 2 | Status: COMPLETED | COMMUNITY
Start: 2020-12-11 | End: 2021-10-05

## 2021-10-05 RX ORDER — ALBUTEROL SULFATE 90 UG/1
108 (90 BASE) INHALANT RESPIRATORY (INHALATION)
Qty: 1 | Refills: 11 | Status: ACTIVE | COMMUNITY
Start: 2020-10-09 | End: 1900-01-01

## 2021-10-05 RX ORDER — AZELASTINE HYDROCHLORIDE 137 UG/1
0.1 SPRAY, METERED NASAL TWICE DAILY
Qty: 1 | Refills: 2 | Status: COMPLETED | COMMUNITY
Start: 2020-12-11 | End: 2021-10-05

## 2021-10-05 NOTE — ASSESSMENT
[FreeTextEntry1] :  \par Basic cardiovascular prevention measures are advised including regular exercise, surveillance medical examination, and prudent portion-controlled low fat diet, rich in a variety of vegetables with minimal added sugars, refined starches, and no artificially hydrogenated oils.\par Discussion and interpretation of previous tests , external notes( labs, radiology, specialist  , patient verbalized understanding.\par Prescription drug management\par renew xanax- ,   wean quantity by 2 more this month Renew cough syrup\par  As per usual protocol the patient was advised in regards to the risks of driving when on medications with side effects of dizziness or drowsiness. Patient has been assessed  for increase risk for respiratory depression. Patient denies suicidal ideations or plan.  Istop checked.\par \par \par  Tips for anxiety and depression:  Start deep-breathing. If you're not focused on how to calm your body through slow, intentional belly breathing ….Meditate. Calm is an inside job. Give yourself the gift of serenity and start the day with 10 minutes of meditation. Practice self-care. . Get rid of clutter, Plan a day trip, go to bed early, reduce caffeine and sugar. Challenge negative core beliefs, Practice gratitude, attend a social gathering, schedule a visit with a therapist, and exercise.\par encouraged patient to see psychiatrist, she is willing to look again for one

## 2021-10-05 NOTE — HISTORY OF PRESENT ILLNESS
[FreeTextEntry1] : Patient present for medication renewal\par  [de-identified] : 56 yo wf here for follow up \par Thinking about moving to [place] still, goes back and forth on deciding to go with her one son there. Possibly entire family may be going. \par \par anxiety is through the roof, I am by myself and I have to pay my bills and everything is too expensive but I am afraid to move. \par I will not need my winter clothes if I go there. \par  I was prescribed xanax  ages ago. It helps my panic attacks. I did not get a psychiatrist yet. It is difficult w insurance\par I have reduced my smoking greatly and am still trying

## 2021-10-05 NOTE — COUNSELING
[Risk of tobacco use and health benefits of smoking cessation discussed] : Risk of tobacco use and health benefits of smoking cessation discussed [Encouraged to pick a quit date and identify support needed to quit] : Encouraged to pick a quit date and identify support needed to quit [Financial issues] : Financial issues [Other: ____] : [unfilled] [Good understanding] : Patient has a good understanding of lifestyle changes and steps needed to achieve self management goal [Cessation strategies including cessation program discussed] : Cessation strategies including cessation program discussed [Use of nicotine replacement therapies and other medications discussed] : Use of nicotine replacement therapies and other medications discussed [FreeTextEntry2] : chantix

## 2021-10-05 NOTE — REVIEW OF SYSTEMS
[Fatigue] : fatigue [Cough] : cough [Negative] : Cardiovascular [Dyspnea on Exertion] : dyspnea on exertion [Anxiety] : anxiety [Shortness Of Breath] : no shortness of breath [Depression] : no depression [FreeTextEntry3] : has been seeing white lilian in the left lower visual field

## 2021-11-08 ENCOUNTER — APPOINTMENT (OUTPATIENT)
Dept: FAMILY MEDICINE | Facility: CLINIC | Age: 56
End: 2021-11-08
Payer: MEDICAID

## 2021-11-08 VITALS
BODY MASS INDEX: 25.77 KG/M2 | DIASTOLIC BLOOD PRESSURE: 80 MMHG | HEIGHT: 70 IN | RESPIRATION RATE: 16 BRPM | WEIGHT: 180 LBS | OXYGEN SATURATION: 98 % | SYSTOLIC BLOOD PRESSURE: 124 MMHG | HEART RATE: 111 BPM

## 2021-11-08 VITALS — TEMPERATURE: 98.3 F

## 2021-11-08 PROCEDURE — 99213 OFFICE O/P EST LOW 20 MIN: CPT

## 2021-11-08 NOTE — ASSESSMENT
[FreeTextEntry1] :  As per usual protocol the patient was advised in regards to the risks of driving when on medications with side effects of dizziness or drowsiness. Patient has been assessed  for increase risk for respiratory depression. Patient denies suicidal ideations or plan.  Istop checked.\par  strongly advised patient to consider therapy or outpatient center for long term use of benzodiazepine to help get off the medications safely.

## 2021-11-08 NOTE — HEALTH RISK ASSESSMENT
[] : Yes [No] : In the past 12 months have you used drugs other than those required for medical reasons? No [No falls in past year] : Patient reported no falls in the past year [Audit-CScore] : 0 [de-identified] : no [de-identified] : no
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
## 2021-12-06 ENCOUNTER — APPOINTMENT (OUTPATIENT)
Dept: FAMILY MEDICINE | Facility: CLINIC | Age: 56
End: 2021-12-06
Payer: MEDICAID

## 2021-12-06 VITALS
BODY MASS INDEX: 25.77 KG/M2 | DIASTOLIC BLOOD PRESSURE: 80 MMHG | OXYGEN SATURATION: 96 % | RESPIRATION RATE: 14 BRPM | HEIGHT: 70 IN | HEART RATE: 108 BPM | SYSTOLIC BLOOD PRESSURE: 118 MMHG | WEIGHT: 180 LBS

## 2021-12-06 DIAGNOSIS — F43.10 POST-TRAUMATIC STRESS DISORDER, UNSPECIFIED: ICD-10-CM

## 2021-12-06 DIAGNOSIS — R05.9 COUGH, UNSPECIFIED: ICD-10-CM

## 2021-12-06 PROCEDURE — 99213 OFFICE O/P EST LOW 20 MIN: CPT

## 2021-12-06 RX ORDER — VARENICLINE TARTRATE 0.5 (11)-1
0.5 MG X 11 & KIT ORAL
Qty: 1 | Refills: 0 | Status: DISCONTINUED | COMMUNITY
Start: 2021-01-15 | End: 2021-12-06

## 2021-12-06 RX ORDER — HYDROCODONE BITARTRATE AND HOMATROPINE METHYLBROMIDE 5; 1.5 MG/5ML; MG/5ML
5-1.5 SOLUTION ORAL
Qty: 100 | Refills: 0 | Status: ACTIVE | COMMUNITY
Start: 2021-01-15 | End: 1900-01-01

## 2021-12-06 RX ORDER — VARENICLINE TARTRATE 1 MG/1
1 TABLET, FILM COATED ORAL TWICE DAILY
Qty: 180 | Refills: 2 | Status: DISCONTINUED | COMMUNITY
Start: 2021-01-15 | End: 2021-12-06

## 2021-12-06 NOTE — HEALTH RISK ASSESSMENT
[] : Yes [No] : In the past 12 months have you used drugs other than those required for medical reasons? No [No falls in past year] : Patient reported no falls in the past year [Audit-CScore] : 0 [de-identified] : no [de-identified] : no
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
## 2021-12-06 NOTE — ASSESSMENT
[FreeTextEntry1] :  As per usual protocol the patient was advised in regards to the risks of driving when on medications with side effects of dizziness or drowsiness. Patient has been assessed  for increase risk for respiratory depression. Patient denies suicidal ideations or plan.  Istop checked.\par  strongly advised patient to consider therapy or outpatient center for long term use of benzodiazepine to help get off the medications safely.\par discussed weaning.

## 2022-01-06 ENCOUNTER — APPOINTMENT (OUTPATIENT)
Dept: FAMILY MEDICINE | Facility: CLINIC | Age: 57
End: 2022-01-06
Payer: MEDICAID

## 2022-01-06 VITALS
HEART RATE: 102 BPM | OXYGEN SATURATION: 97 % | WEIGHT: 180 LBS | SYSTOLIC BLOOD PRESSURE: 120 MMHG | DIASTOLIC BLOOD PRESSURE: 80 MMHG | BODY MASS INDEX: 25.77 KG/M2 | HEIGHT: 70 IN | RESPIRATION RATE: 14 BRPM

## 2022-01-06 DIAGNOSIS — R53.83 OTHER FATIGUE: ICD-10-CM

## 2022-01-06 PROCEDURE — 99213 OFFICE O/P EST LOW 20 MIN: CPT

## 2022-01-06 NOTE — PLAN
[FreeTextEntry1] : Will renew xanax for anxiety\par ISTOP checked [id number]\par discussed weaning and importance to health \par is not ready to [facility] today\par discussed moving to [place] to [facility] and is open, she is looking for a psychiatrist \par recommended finding a detox clinic in [place]

## 2022-01-06 NOTE — HISTORY OF PRESENT ILLNESS
[FreeTextEntry1] : pt presents for med check  [de-identified] : Trying to move to [place], just got back, dog cannot go in a plane, she is finding it difficult to get an appt there. She is having a lot of anxiety about the move and about the vaccine and her sons. More then usual.

## 2022-02-04 ENCOUNTER — APPOINTMENT (OUTPATIENT)
Dept: FAMILY MEDICINE | Facility: CLINIC | Age: 57
End: 2022-02-04
Payer: MEDICAID

## 2022-02-04 VITALS
WEIGHT: 180 LBS | TEMPERATURE: 97 F | RESPIRATION RATE: 14 BRPM | SYSTOLIC BLOOD PRESSURE: 138 MMHG | HEART RATE: 97 BPM | HEIGHT: 70 IN | BODY MASS INDEX: 25.77 KG/M2 | OXYGEN SATURATION: 98 % | DIASTOLIC BLOOD PRESSURE: 90 MMHG

## 2022-02-04 PROCEDURE — 99213 OFFICE O/P EST LOW 20 MIN: CPT

## 2022-02-04 NOTE — HISTORY OF PRESENT ILLNESS
[FreeTextEntry1] : patient presents for medication renewal\par  [de-identified] : Patient is presenting in office for medication renewal, she is moving in March to [place] where she found an apartment, she is having a mental break down about the move she is having and needing insurance and needing a  and a doctor, she is going to try to find a job. She would like an increase in the amount of xanax she is taking.

## 2022-02-04 NOTE — PLAN
[FreeTextEntry1] : Presenting in office for medication renewal\par will not increase amount of xanax\par \par recommend she see a psychiatrist for renewal of medication\par \par had conversation about detox center for her to take her self of xanax but is concerned she cannot afford rent\par \par istop checked #: [id number]

## 2022-02-04 NOTE — PHYSICAL EXAM
[Normal Outer Ear/Nose] : the outer ears and nose were normal in appearance [No JVD] : no jugular venous distention [No Respiratory Distress] : no respiratory distress  [Normal Rate] : normal rate

## 2022-02-28 ENCOUNTER — APPOINTMENT (OUTPATIENT)
Dept: FAMILY MEDICINE | Facility: CLINIC | Age: 57
End: 2022-02-28
Payer: MEDICAID

## 2022-02-28 VITALS
RESPIRATION RATE: 14 BRPM | OXYGEN SATURATION: 98 % | BODY MASS INDEX: 25.77 KG/M2 | WEIGHT: 180 LBS | HEIGHT: 70 IN | DIASTOLIC BLOOD PRESSURE: 90 MMHG | SYSTOLIC BLOOD PRESSURE: 130 MMHG | HEART RATE: 95 BPM

## 2022-02-28 VITALS — TEMPERATURE: 98.1 F

## 2022-02-28 DIAGNOSIS — N39.0 URINARY TRACT INFECTION, SITE NOT SPECIFIED: ICD-10-CM

## 2022-02-28 DIAGNOSIS — F41.9 ANXIETY DISORDER, UNSPECIFIED: ICD-10-CM

## 2022-02-28 DIAGNOSIS — Z72.0 TOBACCO USE: ICD-10-CM

## 2022-02-28 PROCEDURE — 99213 OFFICE O/P EST LOW 20 MIN: CPT

## 2022-02-28 RX ORDER — CLOTRIMAZOLE AND BETAMETHASONE DIPROPIONATE 10; .5 MG/G; MG/G
1-0.05 CREAM TOPICAL TWICE DAILY
Qty: 1 | Refills: 3 | Status: ACTIVE | COMMUNITY
Start: 2020-09-11 | End: 1900-01-01

## 2022-02-28 RX ORDER — ALPRAZOLAM 2 MG/1
2 TABLET ORAL
Qty: 85 | Refills: 0 | Status: ACTIVE | COMMUNITY
Start: 2017-11-06 | End: 1900-01-01

## 2022-02-28 RX ORDER — NITROFURANTOIN MACROCRYSTALS 100 MG/1
100 CAPSULE ORAL
Qty: 20 | Refills: 0 | Status: ACTIVE | COMMUNITY
Start: 2022-02-28 | End: 1900-01-01

## 2022-02-28 NOTE — PLAN
[FreeTextEntry1] : Presenting in office for medication renewal\par will not increase amount of xanax\par \par recommend she see a psychiatrist for renewal of medication\par \par had conversation about detox center for her to take her self of xanax but is concerned she cannot afford rent\par \par istop checked #: [id number]

## 2022-02-28 NOTE — PHYSICAL EXAM
[Normal Outer Ear/Nose] : the outer ears and nose were normal in appearance [No JVD] : no jugular venous distention [No Respiratory Distress] : no respiratory distress  [Normal Rate] : normal rate  [de-identified] : excoriations on back and right arm

## 2022-02-28 NOTE — HISTORY OF PRESENT ILLNESS
[FreeTextEntry1] : patient presents for medication renewal\par  [de-identified] : Patient is presenting in office for medication renewal, \par I am leaving [facility]. I am driving to [place]. I have so many things  to do .  I have an apt in [facility]. It is beautiful. I am leaving early and I need my medications. I think I have a uti\par I would like cream for tinea - I have been sweating a lot\par I am excited but I am scared. \par I might already have a job  lined up.\par \par I have a rash?  what could it be  I am a . \par \par 2/4/22\par  she is moving in March to [place] where she found an apartment, she is having a mental break down about the move she is having and needing insurance and needing a  and a doctor, she is going to try to find a job. She would like an increase in the amount of xanax she is taking.

## 2022-11-15 NOTE — HEALTH RISK ASSESSMENT
[] : Yes [Yes] : Yes [Monthly or less (1 pt)] : Monthly or less (1 point) [No] : In the past 12 months have you used drugs other than those required for medical reasons? No [No falls in past year] : Patient reported no falls in the past year [Audit-CScore] : 1 [de-identified] : cleaning [de-identified] : no Attending Attestation (For Attendings USE Only)...